# Patient Record
Sex: FEMALE | Race: WHITE | NOT HISPANIC OR LATINO | Employment: FULL TIME | URBAN - METROPOLITAN AREA
[De-identification: names, ages, dates, MRNs, and addresses within clinical notes are randomized per-mention and may not be internally consistent; named-entity substitution may affect disease eponyms.]

---

## 2017-02-25 ENCOUNTER — COMMUNICATION - HEALTHEAST (OUTPATIENT)
Dept: FAMILY MEDICINE | Facility: CLINIC | Age: 47
End: 2017-02-25

## 2017-02-25 DIAGNOSIS — F41.9 ANXIETY: ICD-10-CM

## 2017-03-14 ENCOUNTER — OFFICE VISIT - HEALTHEAST (OUTPATIENT)
Dept: FAMILY MEDICINE | Facility: CLINIC | Age: 47
End: 2017-03-14

## 2017-03-14 DIAGNOSIS — G47.9 DIFFICULTY SLEEPING: ICD-10-CM

## 2017-03-14 DIAGNOSIS — F41.0 PANIC ATTACK: ICD-10-CM

## 2017-03-14 DIAGNOSIS — F41.9 ANXIETY: ICD-10-CM

## 2017-04-26 ENCOUNTER — COMMUNICATION - HEALTHEAST (OUTPATIENT)
Dept: FAMILY MEDICINE | Facility: CLINIC | Age: 47
End: 2017-04-26

## 2017-04-26 DIAGNOSIS — G47.00 INSOMNIA: ICD-10-CM

## 2017-05-03 ENCOUNTER — COMMUNICATION - HEALTHEAST (OUTPATIENT)
Dept: FAMILY MEDICINE | Facility: CLINIC | Age: 47
End: 2017-05-03

## 2017-05-09 ENCOUNTER — RECORDS - HEALTHEAST (OUTPATIENT)
Dept: ADMINISTRATIVE | Facility: OTHER | Age: 47
End: 2017-05-09

## 2017-05-25 ENCOUNTER — RECORDS - HEALTHEAST (OUTPATIENT)
Dept: ADMINISTRATIVE | Facility: OTHER | Age: 47
End: 2017-05-25

## 2017-09-18 ENCOUNTER — RECORDS - HEALTHEAST (OUTPATIENT)
Dept: ADMINISTRATIVE | Facility: OTHER | Age: 47
End: 2017-09-18

## 2017-10-27 ENCOUNTER — RECORDS - HEALTHEAST (OUTPATIENT)
Dept: ADMINISTRATIVE | Facility: OTHER | Age: 47
End: 2017-10-27

## 2018-04-10 ENCOUNTER — OFFICE VISIT - HEALTHEAST (OUTPATIENT)
Dept: FAMILY MEDICINE | Facility: CLINIC | Age: 48
End: 2018-04-10

## 2018-04-10 DIAGNOSIS — N95.1 MENOPAUSAL SYMPTOMS: ICD-10-CM

## 2018-04-10 DIAGNOSIS — Z01.419 WELL WOMAN EXAM: ICD-10-CM

## 2018-04-10 DIAGNOSIS — G47.9 SLEEP DIFFICULTIES: ICD-10-CM

## 2018-04-10 DIAGNOSIS — F41.1 GAD (GENERALIZED ANXIETY DISORDER): ICD-10-CM

## 2018-04-10 DIAGNOSIS — F41.9 ANXIETY: ICD-10-CM

## 2018-04-10 DIAGNOSIS — G56.00 CTS (CARPAL TUNNEL SYNDROME): ICD-10-CM

## 2018-04-10 DIAGNOSIS — E66.9 OBESITY: ICD-10-CM

## 2018-04-10 DIAGNOSIS — R63.5 WEIGHT GAIN: ICD-10-CM

## 2018-04-10 DIAGNOSIS — G47.33 OSA ON CPAP: ICD-10-CM

## 2018-04-10 LAB
ALBUMIN SERPL-MCNC: 4.3 G/DL (ref 3.5–5)
ALP SERPL-CCNC: 78 U/L (ref 45–120)
ALT SERPL W P-5'-P-CCNC: 23 U/L (ref 0–45)
ANION GAP SERPL CALCULATED.3IONS-SCNC: 8 MMOL/L (ref 5–18)
AST SERPL W P-5'-P-CCNC: 23 U/L (ref 0–40)
BILIRUB SERPL-MCNC: 0.6 MG/DL (ref 0–1)
BUN SERPL-MCNC: 16 MG/DL (ref 8–22)
CALCIUM SERPL-MCNC: 9.7 MG/DL (ref 8.5–10.5)
CHLORIDE BLD-SCNC: 105 MMOL/L (ref 98–107)
CHOLEST SERPL-MCNC: 226 MG/DL
CO2 SERPL-SCNC: 27 MMOL/L (ref 22–31)
CREAT SERPL-MCNC: 0.76 MG/DL (ref 0.6–1.1)
ERYTHROCYTE [DISTWIDTH] IN BLOOD BY AUTOMATED COUNT: 11.8 % (ref 11–14.5)
FASTING STATUS PATIENT QL REPORTED: YES
GFR SERPL CREATININE-BSD FRML MDRD: >60 ML/MIN/1.73M2
GLUCOSE BLD-MCNC: 106 MG/DL (ref 70–125)
HBA1C MFR BLD: 5.7 % (ref 3.5–6)
HCT VFR BLD AUTO: 40.4 % (ref 35–47)
HDLC SERPL-MCNC: 43 MG/DL
HGB BLD-MCNC: 13.8 G/DL (ref 12–16)
LDLC SERPL CALC-MCNC: 146 MG/DL
MCH RBC QN AUTO: 33.2 PG (ref 27–34)
MCHC RBC AUTO-ENTMCNC: 34.2 G/DL (ref 32–36)
MCV RBC AUTO: 97 FL (ref 80–100)
PLATELET # BLD AUTO: 253 THOU/UL (ref 140–440)
PMV BLD AUTO: 7.4 FL (ref 7–10)
POTASSIUM BLD-SCNC: 4.7 MMOL/L (ref 3.5–5)
PROT SERPL-MCNC: 7.7 G/DL (ref 6–8)
RBC # BLD AUTO: 4.16 MILL/UL (ref 3.8–5.4)
SODIUM SERPL-SCNC: 140 MMOL/L (ref 136–145)
TRIGL SERPL-MCNC: 184 MG/DL
TSH SERPL DL<=0.005 MIU/L-ACNC: 1.92 UIU/ML (ref 0.3–5)
WBC: 5.6 THOU/UL (ref 4–11)

## 2018-04-10 ASSESSMENT — MIFFLIN-ST. JEOR: SCORE: 1666.89

## 2018-04-16 ENCOUNTER — COMMUNICATION - HEALTHEAST (OUTPATIENT)
Dept: FAMILY MEDICINE | Facility: CLINIC | Age: 48
End: 2018-04-16

## 2018-04-22 ENCOUNTER — COMMUNICATION - HEALTHEAST (OUTPATIENT)
Dept: FAMILY MEDICINE | Facility: CLINIC | Age: 48
End: 2018-04-22

## 2018-04-22 DIAGNOSIS — F41.9 ANXIETY: ICD-10-CM

## 2018-04-26 ENCOUNTER — RECORDS - HEALTHEAST (OUTPATIENT)
Dept: ADMINISTRATIVE | Facility: OTHER | Age: 48
End: 2018-04-26

## 2018-05-10 ENCOUNTER — COMMUNICATION - HEALTHEAST (OUTPATIENT)
Dept: FAMILY MEDICINE | Facility: CLINIC | Age: 48
End: 2018-05-10

## 2018-05-10 DIAGNOSIS — F41.9 ANXIETY: ICD-10-CM

## 2018-06-07 ENCOUNTER — COMMUNICATION - HEALTHEAST (OUTPATIENT)
Dept: FAMILY MEDICINE | Facility: CLINIC | Age: 48
End: 2018-06-07

## 2018-06-07 DIAGNOSIS — N95.1 MENOPAUSAL SYMPTOMS: ICD-10-CM

## 2018-08-13 ENCOUNTER — COMMUNICATION - HEALTHEAST (OUTPATIENT)
Dept: FAMILY MEDICINE | Facility: CLINIC | Age: 48
End: 2018-08-13

## 2019-05-12 ENCOUNTER — COMMUNICATION - HEALTHEAST (OUTPATIENT)
Dept: FAMILY MEDICINE | Facility: CLINIC | Age: 49
End: 2019-05-12

## 2019-05-12 DIAGNOSIS — F41.9 ANXIETY: ICD-10-CM

## 2019-05-28 ENCOUNTER — COMMUNICATION - HEALTHEAST (OUTPATIENT)
Dept: TELEHEALTH | Facility: CLINIC | Age: 49
End: 2019-05-28

## 2019-06-04 ENCOUNTER — OFFICE VISIT - HEALTHEAST (OUTPATIENT)
Dept: FAMILY MEDICINE | Facility: CLINIC | Age: 49
End: 2019-06-04

## 2019-06-04 DIAGNOSIS — R73.03 PREDIABETES: ICD-10-CM

## 2019-06-04 DIAGNOSIS — R63.1 EXCESSIVE THIRST: ICD-10-CM

## 2019-06-04 DIAGNOSIS — Z12.31 VISIT FOR SCREENING MAMMOGRAM: ICD-10-CM

## 2019-06-04 LAB
ANION GAP SERPL CALCULATED.3IONS-SCNC: 9 MMOL/L (ref 5–18)
BUN SERPL-MCNC: 15 MG/DL (ref 8–22)
CALCIUM SERPL-MCNC: 9.8 MG/DL (ref 8.5–10.5)
CHLORIDE BLD-SCNC: 106 MMOL/L (ref 98–107)
CO2 SERPL-SCNC: 24 MMOL/L (ref 22–31)
CREAT SERPL-MCNC: 0.8 MG/DL (ref 0.6–1.1)
GFR SERPL CREATININE-BSD FRML MDRD: >60 ML/MIN/1.73M2
GLUCOSE BLD-MCNC: 107 MG/DL (ref 70–125)
HBA1C MFR BLD: 5.9 % (ref 3.5–6)
POTASSIUM BLD-SCNC: 4.5 MMOL/L (ref 3.5–5)
SODIUM SERPL-SCNC: 139 MMOL/L (ref 136–145)

## 2019-07-26 ENCOUNTER — OFFICE VISIT - HEALTHEAST (OUTPATIENT)
Dept: FAMILY MEDICINE | Facility: CLINIC | Age: 49
End: 2019-07-26

## 2019-07-26 DIAGNOSIS — G47.33 OSA ON CPAP: ICD-10-CM

## 2019-07-26 DIAGNOSIS — E66.01 MORBID OBESITY (H): ICD-10-CM

## 2019-07-26 DIAGNOSIS — Z00.01 ENCOUNTER FOR GENERAL ADULT MEDICAL EXAMINATION WITH ABNORMAL FINDINGS: ICD-10-CM

## 2019-07-26 DIAGNOSIS — F41.9 ANXIETY: ICD-10-CM

## 2019-07-26 DIAGNOSIS — R73.03 PREDIABETES: ICD-10-CM

## 2019-07-26 DIAGNOSIS — N95.1 PERIMENOPAUSAL: ICD-10-CM

## 2019-07-26 DIAGNOSIS — Z11.3 SCREEN FOR STD (SEXUALLY TRANSMITTED DISEASE): ICD-10-CM

## 2019-07-26 DIAGNOSIS — Z12.4 SCREENING FOR CERVICAL CANCER: ICD-10-CM

## 2019-07-26 RX ORDER — ALPRAZOLAM 1 MG
TABLET ORAL
Qty: 15 TABLET | Refills: 0 | Status: SHIPPED | OUTPATIENT
Start: 2019-07-26

## 2019-07-26 ASSESSMENT — MIFFLIN-ST. JEOR: SCORE: 1670.18

## 2019-07-29 LAB
C TRACH DNA SPEC QL PROBE+SIG AMP: NEGATIVE
HPV SOURCE: NORMAL
HUMAN PAPILLOMA VIRUS 16 DNA: NEGATIVE
HUMAN PAPILLOMA VIRUS 18 DNA: NEGATIVE
HUMAN PAPILLOMA VIRUS FINAL DIAGNOSIS: NORMAL
HUMAN PAPILLOMA VIRUS OTHER HR: NEGATIVE
N GONORRHOEA DNA SPEC QL NAA+PROBE: NEGATIVE
SPECIMEN DESCRIPTION: NORMAL

## 2019-07-30 ENCOUNTER — COMMUNICATION - HEALTHEAST (OUTPATIENT)
Dept: FAMILY MEDICINE | Facility: CLINIC | Age: 49
End: 2019-07-30

## 2019-07-31 LAB
BKR LAB AP ABNORMAL BLEEDING: NO
BKR LAB AP BIRTH CONTROL/HORMONES: NORMAL
BKR LAB AP CERVICAL APPEARANCE: NORMAL
BKR LAB AP GYN ADEQUACY: NORMAL
BKR LAB AP GYN INTERPRETATION: NORMAL
BKR LAB AP HPV REFLEX: NORMAL
BKR LAB AP LMP: NORMAL
BKR LAB AP PATIENT STATUS: NORMAL
BKR LAB AP PREVIOUS ABNORMAL: NORMAL
BKR LAB AP PREVIOUS NORMAL: NORMAL
HIGH RISK?: YES
PATH REPORT.COMMENTS IMP SPEC: NORMAL
RESULT FLAG (HE HISTORICAL CONVERSION): NORMAL

## 2019-08-19 ENCOUNTER — RECORDS - HEALTHEAST (OUTPATIENT)
Dept: ADMINISTRATIVE | Facility: OTHER | Age: 49
End: 2019-08-19

## 2019-09-02 ENCOUNTER — COMMUNICATION - HEALTHEAST (OUTPATIENT)
Dept: FAMILY MEDICINE | Facility: CLINIC | Age: 49
End: 2019-09-02

## 2019-09-02 DIAGNOSIS — F41.9 ANXIETY: ICD-10-CM

## 2020-02-06 ENCOUNTER — RECORDS - HEALTHEAST (OUTPATIENT)
Dept: ADMINISTRATIVE | Facility: OTHER | Age: 50
End: 2020-02-06

## 2020-02-08 ENCOUNTER — TRANSFERRED RECORDS (OUTPATIENT)
Dept: HEALTH INFORMATION MANAGEMENT | Facility: CLINIC | Age: 50
End: 2020-02-08

## 2020-02-17 ENCOUNTER — TRANSFERRED RECORDS (OUTPATIENT)
Dept: HEALTH INFORMATION MANAGEMENT | Facility: CLINIC | Age: 50
End: 2020-02-17

## 2020-03-02 ENCOUNTER — RECORDS - HEALTHEAST (OUTPATIENT)
Dept: ADMINISTRATIVE | Facility: OTHER | Age: 50
End: 2020-03-02

## 2020-06-25 ENCOUNTER — COMMUNICATION - HEALTHEAST (OUTPATIENT)
Dept: FAMILY MEDICINE | Facility: CLINIC | Age: 50
End: 2020-06-25

## 2020-06-25 DIAGNOSIS — F41.9 ANXIETY: ICD-10-CM

## 2020-08-25 ENCOUNTER — RECORDS - HEALTHEAST (OUTPATIENT)
Dept: ADMINISTRATIVE | Facility: OTHER | Age: 50
End: 2020-08-25

## 2020-09-26 ENCOUNTER — COMMUNICATION - HEALTHEAST (OUTPATIENT)
Dept: FAMILY MEDICINE | Facility: CLINIC | Age: 50
End: 2020-09-26

## 2020-09-26 DIAGNOSIS — F41.9 ANXIETY: ICD-10-CM

## 2020-11-23 ENCOUNTER — OFFICE VISIT - HEALTHEAST (OUTPATIENT)
Dept: FAMILY MEDICINE | Facility: CLINIC | Age: 50
End: 2020-11-23

## 2020-11-23 DIAGNOSIS — G47.9 SLEEP DIFFICULTIES: ICD-10-CM

## 2020-11-23 DIAGNOSIS — R73.03 PREDIABETES: ICD-10-CM

## 2020-11-23 DIAGNOSIS — N95.1 MENOPAUSAL SYNDROME (HOT FLASHES): ICD-10-CM

## 2020-11-23 DIAGNOSIS — E55.9 VITAMIN D DEFICIENCY: ICD-10-CM

## 2020-11-23 DIAGNOSIS — G47.33 OSA ON CPAP: ICD-10-CM

## 2020-11-23 DIAGNOSIS — F41.1 GAD (GENERALIZED ANXIETY DISORDER): ICD-10-CM

## 2020-11-23 DIAGNOSIS — Z78.0 POSTMENOPAUSAL STATUS: ICD-10-CM

## 2020-11-23 DIAGNOSIS — E66.01 MORBID OBESITY (H): ICD-10-CM

## 2020-11-23 DIAGNOSIS — R06.02 SHORTNESS OF BREATH: ICD-10-CM

## 2020-11-23 DIAGNOSIS — Z13.220 LIPID SCREENING: ICD-10-CM

## 2020-11-23 DIAGNOSIS — Z00.01 ENCOUNTER FOR GENERAL ADULT MEDICAL EXAMINATION WITH ABNORMAL FINDINGS: ICD-10-CM

## 2020-11-23 LAB
ALBUMIN SERPL-MCNC: 4 G/DL (ref 3.5–5)
ALP SERPL-CCNC: 67 U/L (ref 45–120)
ALT SERPL W P-5'-P-CCNC: 44 U/L (ref 0–45)
ANION GAP SERPL CALCULATED.3IONS-SCNC: 9 MMOL/L (ref 5–18)
AST SERPL W P-5'-P-CCNC: 28 U/L (ref 0–40)
BILIRUB SERPL-MCNC: 0.3 MG/DL (ref 0–1)
BUN SERPL-MCNC: 17 MG/DL (ref 8–22)
CALCIUM SERPL-MCNC: 9.5 MG/DL (ref 8.5–10.5)
CHLORIDE BLD-SCNC: 106 MMOL/L (ref 98–107)
CHOLEST SERPL-MCNC: 168 MG/DL
CO2 SERPL-SCNC: 27 MMOL/L (ref 22–31)
CREAT SERPL-MCNC: 0.8 MG/DL (ref 0.6–1.1)
ERYTHROCYTE [DISTWIDTH] IN BLOOD BY AUTOMATED COUNT: 11.4 % (ref 11–14.5)
FASTING STATUS PATIENT QL REPORTED: YES
FSH SERPL-ACNC: 44.7 MIU/ML
GFR SERPL CREATININE-BSD FRML MDRD: >60 ML/MIN/1.73M2
GLUCOSE BLD-MCNC: 101 MG/DL (ref 70–125)
HBA1C MFR BLD: 6.5 %
HCT VFR BLD AUTO: 40.6 % (ref 35–47)
HDLC SERPL-MCNC: 31 MG/DL
HGB BLD-MCNC: 13.2 G/DL (ref 12–16)
LDLC SERPL CALC-MCNC: 106 MG/DL
MCH RBC QN AUTO: 30.9 PG (ref 27–34)
MCHC RBC AUTO-ENTMCNC: 32.5 G/DL (ref 32–36)
MCV RBC AUTO: 95 FL (ref 80–100)
PLATELET # BLD AUTO: 401 THOU/UL (ref 140–440)
PMV BLD AUTO: 6.9 FL (ref 7–10)
POTASSIUM BLD-SCNC: 4.8 MMOL/L (ref 3.5–5)
PROT SERPL-MCNC: 7.5 G/DL (ref 6–8)
RBC # BLD AUTO: 4.27 MILL/UL (ref 3.8–5.4)
SODIUM SERPL-SCNC: 142 MMOL/L (ref 136–145)
TRIGL SERPL-MCNC: 154 MG/DL
WBC: 7.3 THOU/UL (ref 4–11)

## 2020-11-23 ASSESSMENT — ANXIETY QUESTIONNAIRES
2. NOT BEING ABLE TO STOP OR CONTROL WORRYING: MORE THAN HALF THE DAYS
6. BECOMING EASILY ANNOYED OR IRRITABLE: NOT AT ALL
GAD7 TOTAL SCORE: 10
IF YOU CHECKED OFF ANY PROBLEMS ON THIS QUESTIONNAIRE, HOW DIFFICULT HAVE THESE PROBLEMS MADE IT FOR YOU TO DO YOUR WORK, TAKE CARE OF THINGS AT HOME, OR GET ALONG WITH OTHER PEOPLE: SOMEWHAT DIFFICULT
3. WORRYING TOO MUCH ABOUT DIFFERENT THINGS: MORE THAN HALF THE DAYS
5. BEING SO RESTLESS THAT IT IS HARD TO SIT STILL: NOT AT ALL
1. FEELING NERVOUS, ANXIOUS, OR ON EDGE: MORE THAN HALF THE DAYS
7. FEELING AFRAID AS IF SOMETHING AWFUL MIGHT HAPPEN: MORE THAN HALF THE DAYS
4. TROUBLE RELAXING: MORE THAN HALF THE DAYS

## 2020-11-23 ASSESSMENT — MIFFLIN-ST. JEOR: SCORE: 1751.83

## 2020-11-23 ASSESSMENT — PATIENT HEALTH QUESTIONNAIRE - PHQ9: SUM OF ALL RESPONSES TO PHQ QUESTIONS 1-9: 9

## 2020-11-24 LAB
25(OH)D3 SERPL-MCNC: 16.9 NG/ML (ref 30–80)
25(OH)D3 SERPL-MCNC: 16.9 NG/ML (ref 30–80)

## 2020-12-20 ENCOUNTER — COMMUNICATION - HEALTHEAST (OUTPATIENT)
Dept: FAMILY MEDICINE | Facility: CLINIC | Age: 50
End: 2020-12-20

## 2020-12-20 DIAGNOSIS — F41.1 GAD (GENERALIZED ANXIETY DISORDER): ICD-10-CM

## 2020-12-31 ENCOUNTER — COMMUNICATION - HEALTHEAST (OUTPATIENT)
Dept: FAMILY MEDICINE | Facility: CLINIC | Age: 50
End: 2020-12-31

## 2020-12-31 DIAGNOSIS — F41.9 ANXIETY: ICD-10-CM

## 2021-01-04 RX ORDER — CITALOPRAM HYDROBROMIDE 40 MG/1
TABLET ORAL
Qty: 90 TABLET | Refills: 3 | Status: SHIPPED | OUTPATIENT
Start: 2021-01-04 | End: 2022-01-21

## 2021-02-13 ENCOUNTER — COMMUNICATION - HEALTHEAST (OUTPATIENT)
Dept: FAMILY MEDICINE | Facility: CLINIC | Age: 51
End: 2021-02-13

## 2021-02-13 DIAGNOSIS — E55.9 VITAMIN D DEFICIENCY: ICD-10-CM

## 2021-05-07 ENCOUNTER — OFFICE VISIT - HEALTHEAST (OUTPATIENT)
Dept: FAMILY MEDICINE | Facility: CLINIC | Age: 51
End: 2021-05-07

## 2021-05-07 DIAGNOSIS — R73.01 IMPAIRED FASTING GLUCOSE: ICD-10-CM

## 2021-05-07 DIAGNOSIS — R10.2 PELVIC PAIN IN FEMALE: ICD-10-CM

## 2021-05-07 DIAGNOSIS — E66.01 MORBID OBESITY (H): ICD-10-CM

## 2021-05-07 LAB
ALBUMIN UR-MCNC: NEGATIVE G/DL
APPEARANCE UR: CLEAR
BACTERIA #/AREA URNS HPF: ABNORMAL /[HPF]
BILIRUB UR QL STRIP: NEGATIVE
COLOR UR AUTO: YELLOW
GLUCOSE UR STRIP-MCNC: NEGATIVE MG/DL
HBA1C MFR BLD: 5.9 %
HGB UR QL STRIP: NEGATIVE
KETONES UR STRIP-MCNC: NEGATIVE MG/DL
LEUKOCYTE ESTERASE UR QL STRIP: ABNORMAL
NITRATE UR QL: NEGATIVE
PH UR STRIP: 5.5 [PH] (ref 5–8)
RBC #/AREA URNS AUTO: ABNORMAL HPF
SP GR UR STRIP: 1.02 (ref 1–1.03)
SQUAMOUS #/AREA URNS AUTO: ABNORMAL LPF
UROBILINOGEN UR STRIP-ACNC: ABNORMAL
WBC #/AREA URNS AUTO: ABNORMAL HPF

## 2021-05-07 ASSESSMENT — MIFFLIN-ST. JEOR: SCORE: 1771.67

## 2021-05-08 LAB — BACTERIA SPEC CULT: NORMAL

## 2021-05-16 ENCOUNTER — COMMUNICATION - HEALTHEAST (OUTPATIENT)
Dept: FAMILY MEDICINE | Facility: CLINIC | Age: 51
End: 2021-05-16

## 2021-05-16 DIAGNOSIS — R10.84 ABDOMINAL PAIN, GENERALIZED: ICD-10-CM

## 2021-05-16 DIAGNOSIS — R10.31 RLQ ABDOMINAL PAIN: ICD-10-CM

## 2021-05-21 ENCOUNTER — RECORDS - HEALTHEAST (OUTPATIENT)
Dept: ADMINISTRATIVE | Facility: OTHER | Age: 51
End: 2021-05-21

## 2021-05-26 ASSESSMENT — PATIENT HEALTH QUESTIONNAIRE - PHQ9: SUM OF ALL RESPONSES TO PHQ QUESTIONS 1-9: 9

## 2021-05-27 VITALS
OXYGEN SATURATION: 99 % | WEIGHT: 245.38 LBS | SYSTOLIC BLOOD PRESSURE: 131 MMHG | TEMPERATURE: 97 F | BODY MASS INDEX: 38.51 KG/M2 | DIASTOLIC BLOOD PRESSURE: 73 MMHG | HEART RATE: 79 BPM | HEIGHT: 67 IN

## 2021-05-28 ENCOUNTER — RECORDS - HEALTHEAST (OUTPATIENT)
Dept: ADMINISTRATIVE | Facility: OTHER | Age: 51
End: 2021-05-28

## 2021-05-28 ASSESSMENT — ANXIETY QUESTIONNAIRES: GAD7 TOTAL SCORE: 10

## 2021-05-28 NOTE — TELEPHONE ENCOUNTER
RN cannot approve Refill Request    RN can NOT refill this medication PCP messaged that patient is overdue for Office Visit. Last office visit: 3/14/2017 Reva Busby DO Last Physical: 4/10/2018 Last MTM visit: Visit date not found Last visit same specialty: 3/14/2017 Reva Busby DO.  Next visit within 3 mo: Visit date not found  Next physical within 3 mo: Visit date not found      Angela Gentile, [facility] Triage/Med Refill 5/12/2019    Requested Prescriptions   Pending Prescriptions Disp Refills     citalopram (CELEXA) 40 MG tablet 90 tablet 3     Sig: Take 1 tablet (40 mg total) by mouth daily.       SSRI Refill Protocol  Failed - 5/12/2019  8:42 PM        Failed - PCP or prescribing provider visit in last year     Last office visit with prescriber/PCP: 3/14/2017 Reva Busby DO OR same dept: Visit date not found OR same specialty: 3/14/2017 Reva Busby DO  Last physical: 4/10/2018 Last MTM visit: Visit date not found   Next visit within 3 mo: Visit date not found  Next physical within 3 mo: Visit date not found  Prescriber OR PCP: Reva Busby DO  Last diagnosis associated with med order: 1. Anxiety  - citalopram (CELEXA) 40 MG tablet; Take 1 tablet (40 mg total) by mouth daily.  Dispense: 90 tablet; Refill: 3    If protocol passes may refill for 12 months if within 3 months of last provider visit (or a total of 15 months).

## 2021-05-29 NOTE — PROGRESS NOTES
ASSESSMENT:  1. Excessive thirst  Glycosylated Hemoglobin A1c   2. Visit for screening mammogram     3. Prediabetes  Basic Metabolic Panel       PLAN:  Lab work is consistent with prediabetes, discussed dietary modifications, increasing exercise to control this.  Patient has an upcoming physical with Dr. Busby in a couple months and discussed that if she make some positive changes us to be a good time to potentially recheck at least fasting blood sugar and see if there has been improvements.  No problem-specific Assessment & Plan notes found for this encounter.    The following high BMI interventions were performed this visit: encouragement to exercise, weight monitoring and dietary management education, guidance, and counseling  There are no Patient Instructions on file for this visit.    Orders Placed This Encounter   Procedures     Glycosylated Hemoglobin A1c     Basic Metabolic Panel     Medications Discontinued During This Encounter   Medication Reason     citalopram (CELEXA) 40 MG tablet Duplicate order       Return in about 6 weeks (around 7/16/2019) for Annual exam.    CHIEF COMPLAINT:  Chief Complaint   Patient presents with     Anxiety     pt states she does not know if it is anxiety but she get short of breath at times as is worried about her heart      Diabetes     pt is worried about having diabetes due to her mother having type 2        HISTORY OF PRESENT ILLNESS:  Natalie is a 48 y.o. female here today to discuss positive diabetes.  Patient also brought up anxiety with the nurse but did not discuss with me and so focused on the on diabetes prediabetes this visit.  Patient states she is little concerned about diabetes of her mother has type 2 diabetes and it does run in her family.  She knows she is overweight and is also been feeling like she has had excessive thirst lately.  She is wondering if some labs can be checked to assess diabetes and blood sugar to see where she is at.  She would like to  work on making some lifestyle changes as it applies to this issue and would like some recommendations on dietary change.    REVIEW OF SYSTEMS:        All other systems are negative  PFSH:  Reviewed, no changes      TOBACCO USE:  Social History     Tobacco Use   Smoking Status Never Smoker   Smokeless Tobacco Never Used       VITALS:  Vitals:    06/04/19 0953   BP: 116/70   Patient Site: Left Arm   Patient Position: Sitting   Cuff Size: Adult Large   Pulse: 84   Resp: 16   Weight: (!) 230 lb 4 oz (104.4 kg)     Wt Readings from Last 3 Encounters:   06/04/19 (!) 230 lb 4 oz (104.4 kg)   04/10/18 221 lb 6.4 oz (100.4 kg)   03/14/17 203 lb 11.2 oz (92.4 kg)       PHYSICAL EXAM:   /70 (Patient Site: Left Arm, Patient Position: Sitting, Cuff Size: Adult Large)   Pulse 84   Resp 16   Wt (!) 230 lb 4 oz (104.4 kg)   BMI 36.06 kg/m    General appearance: alert, appears stated age and cooperative  Lungs: clear to auscultation bilaterally  Heart: regular rate and rhythm, S1, S2 normal, no murmur, click, rub or gallop  Neurologic: Grossly normal  Psychologic: Mood and affect normal.      DATA REVIEWED:  Additional History from Old Records Summarized (2): 0  Decision to Obtain Records (1): 0  Radiology Tests Summarized or Ordered (1): 00  Labs Reviewed or Ordered (1): 1  Medicine Test Summarized or Ordered (1): 0  Independent Review of EKG or X-RAY(2 each): 0    The visit lasted a total of 25 minutes face to face with the patient. Over 50% of the time was spent counseling and educating the patient about plan of care.    MEDICATIONS:  Current Outpatient Medications   Medication Sig Dispense Refill     ALPRAZolam (XANAX) 1 MG tablet Take 1/2 tablet by mouth as needed for panic attack 10 tablet 0     citalopram (CELEXA) 40 MG tablet TAKE ONE TABLET BY MOUTH ONE TIME DAILY 90 tablet 3     conjugated estrogens (PREMARIN) vaginal cream Apply peasized amount intravaginally 3 days per week as directed 42.5 g 1     minocycline  (MINOCIN,DYNACIN) 100 MG capsule TAKE ONE CAPSULE BY MOUTH TWICE A DAY  3     progesterone (PROMETRIUM) 100 MG capsule TAKE 1 CAPSULE (100 MG TOTAL) BY MOUTH AT BEDTIME. 30 capsule 2     No current facility-administered medications for this visit.        This note has been dictated using voice recognition software. Any grammatical or context distortions are unintentional and inherent to the software

## 2021-05-30 VITALS — WEIGHT: 203.7 LBS | BODY MASS INDEX: 30.97 KG/M2

## 2021-05-30 NOTE — PROGRESS NOTES
FEMALE ADULT PREVENTIVE EXAM    CHIEF COMPLAINT:  Female preventive exam.    SUBJECTIVE:  Natalie Kitchen is a 48 y.o. female here for annual physical     said last week that he is  her. Wasn't expecting even though hasnt been good awhile. Did counseling. infidelity . Had trust issues.   -erbal and emotional no physical.  10=girl/13/15 -boys  -do a lot of family things together. Kids devastated.  17 years, mom and brother and sister carina have been supportive.   -will have to go back to work now. 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 Daughter only 10.   -gone back to family counseling fr the kids as a couple.   -citalopram has been helpful up till now. Would have scored the PHQ9 MUCH differently 1 mo ago.   -had episodes of shortness of breath over the last year and doesn't know if panic related. Not sure if were triggered.  No other sx with it. Typically would come on rest   - 2 kids asthma, and allergies but she doesn't have any.   -I think I am in menopause. Doesn't remember last time had period. Gets maybe once a year . Get hot flashes.   -has cpap and was doing progesterone for sleep. Sleep better.   -had a panic attack one day, nauseated, lied down and wasn't sure if diabetes because mom had   -has had difficult time being intimate with spouse, been a long time. Does not think needs std screening   -hx abn pap college cryocautery       She  has a past medical history of Abnormal Pap smear of cervix and Post partum depression.    Lab Results   Component Value Date    WBC 5.6 04/10/2018    HGB 13.8 04/10/2018    HCT 40.4 04/10/2018    MCV 97 04/10/2018     04/10/2018     06/04/2019    K 4.5 06/04/2019    BUN 15 06/04/2019     Lab Results   Component Value Date    CHOL 226 (H) 04/10/2018    HDL 43 (L) 04/10/2018    LDLCALC 146 (H) 04/10/2018    TRIG 184 (H) 04/10/2018     Lab Results    Component Value Date    TSH 1.92 04/10/2018     BP Readings from Last 3 Encounters:   07/26/19 109/62   06/04/19 116/70   04/10/18 102/72       Surgeries:    Past Surgical History:   Procedure Laterality Date     NO PAST SURGERIES         Family History:  Her family history includes Asthma in her unknown relative; Brain cancer in her unknown relative; Depression in her unknown relative; Diabetes in her unknown relative; Hypertension in her unknown relative; Stroke in her unknown relative.    Social History:  She  reports that she has never smoked. She has never used smokeless tobacco. She reports that she drinks about 2.5 oz of alcohol per week. She reports that she does not use drugs.    Medications:    Current Outpatient Medications:      ALPRAZolam (XANAX) 1 MG tablet, Take 1/2 tablet by mouth as needed for panic attack, Disp: 15 tablet, Rfl: 0     citalopram (CELEXA) 40 MG tablet, TAKE ONE TABLET BY MOUTH ONE TIME DAILY, Disp: 90 tablet, Rfl: 3  HELD MEDICATIONS: None.    Allergies:  No latex allergies.  Allergies   Allergen Reactions     Amoxicillin      Vitamin E Rash       RISK BEHAVIOR & HEALTH HABITS  History of abnormal pap smear: in college, had cryotherapy   Date of previous pap smear: 2017 at [facility]   Mammography: 2018 normal, dense  Regular Exercise: no.  Balanced diet: yes .  Seat Belt Use: yes   Guns: NO  Sun Screen: YES  Dental Care: YES    REVIEW OF SYSTEMS:  Complete head to toe review of systems is otherwise negative except as above.    OBJECTIVE:  VITAL SIGNS:    Vitals:    07/26/19 1202   BP: 109/62   Pulse: 77   Resp: 8   Temp: 98.1  F (36.7  C)   SpO2: 98%     GENERAL:  Patient alert, in no acute distress.  EYES: PERRLA. Extraoccular movements intact, pupils equal, reactive to light and accommodation.  Normal conjunctiva and lids.  Undilated fudoscopic exam normal, including normal size, appearance cup-to-disc ratio.  Normal posterior segments, including no exudates or  hemorrhages.  ENT:  Hearing grossly normal.  Normal appearance to ears and nose.  Bilateral TM s, external canals, oropharynx normal. Normal lips, gums and teeth.  Normal nasal mucosa, septum and turbinates.  NECK:  Supple, without thyromegaly or mass.  RESP:  Clear to auscultation without crackles, wheezes or distress.  Normal respiratory effort.   CV:  Regular rate and rhythm without murmurs, rubs or gallops.  Normal carotid, abdominal aorta, femoral and pedal pulses.  No varicosities or edema.  ABDOMEN:  Soft, non-tender, without hepatosplenomegaly, masses, or hernias.   BREASTS:  Nontender, without masses, nipple discharge, erythema, or axillary adenopathy.    :  Normal pelvic exam, including external genitalia with normal appearance and no lesions.  Normal vaginal exam, including no discharge and normal pelvic support, and no lesions.   Normal ureters, with no masses, tenerness or scarring.  Normal bladder, with no fullness, masses or tenderness.  Cervix well visualized, with normal appearance and no lesions or discharge.  Normal uterus, including normal size, shape, mobility with no tenderness.  Normal adnexa, including no nodules, masses or tenderness.  LYMPHATIC: Normal palpation of neck, groin and axilla..  No lymphadenopathy.  No bruising.  NEURO:  CN II-XII intact, motor & sensory function all intact.  DTR and reflexes normal.  PSYCHIATRIC:  Alert & oriented with normal mood and affect.  Good judgment and insight.  SKIN:  Normal inspection and palpation.  MUSCULOSKELETAL: Normal gait and station.  - Spine / Ribs / Pelvis: Normal inspection, ROM, stability and strength: Spine, Head, Neck, Upper and Lower Extremities.    ASSESSMENT & PLAN  Natalie was seen today for annual exam.    Diagnoses and all orders for this visit:    Encounter for general adult medical examination with abnormal [id number]-date with health maintenance although is due for annual mammogram and she will plan on scheduling  this.Pap smear updated today.  Fasting lab work also reviewed from office visit with Leeann Conrad so was not repeated today.  Did not feel inclined to need a lipid panel.  We discussed her prediabetes.  STD screening performed today    Screening for cervical cancer  -     Gynecologic Cytology (PAP Smear)    Screen for STD (sexually transmitted disease)  -     Chlamydia trachomatis & Neisseria gonorrhoeae, Amplified Detection    Anxiety -her anxiety was stable on the citalopram 40 mg up until a week ago when she found out about the divorce.  No recommendation for adjusting that dose yet at this time but we discussed at length the importance of having a counselor or therapist.  She will consider.  I did give her a short course of alprazolam to help with acute panic needs.  She has never abused this medication in the past and still had several leftover from 2017 but did not know if they are still okay to take since   -     ALPRAZolam (XANAX) 1 MG tablet; Take 1/2 tablet by mouth as needed for panic attack    Prediabetes/Morbid obesity (H)-discussed the importance of exercise for mental health and also to prevent the onset of diabetes.  Offered any specialty referrals if she is interested    Perimenopausal-discussed ways to minimize hot flashes    WALDEMAR on CPAP-wearing faithfully with good response  Patient instructions:  -for the hot flashes consider - vitamin E 400IU daily might help or   Primrose oil for hot flashes  - or black cohosh   -for the canker sores- could trial B complex vitamin for prevention and even consider topical hydrocortisone   -schedule mammogram  General  Immunizations reviewed and updated .  Alcohol use, safety and moderation discussed.  Recommended adequate calcium intake/osteoporosis prevention.  Discussed colon cancer screening at age 50, 45 if -American.  Diet and exercise reviewed, including goal of aerobic exercise 30-90 minutes most days of the week, moderation of portions sizes,  avoiding eating out and fast food and increase in fruits and vegetables.  Discussed safe sex practices.  Discussed & recommended seat belt (& motorcycle helmet) use.  Discussed & recommended smoke detector.  Discussed sun protection.  Discussed weight management.

## 2021-05-30 NOTE — PATIENT INSTRUCTIONS - HE
-for the hot flashes consider - vitamin E 400IU daily might help or   Primrose oil for hot flashes  - or black cohosh   -for the canker sores- could trial B complex vitamin for prevention and even consider topical hydrocortisone   -schedule mammogram   -

## 2021-05-31 NOTE — TELEPHONE ENCOUNTER
Refill Approved    Rx renewed per Medication Renewal Policy. Medication was last renewed on 4/10/18, last OV 7/26/19.    Cammie Shepard, [facility] Triage/Med Refill 9/2/2019     Requested Prescriptions   Pending Prescriptions Disp Refills     citalopram (CELEXA) 40 MG tablet 90 tablet 3     Sig: TAKE ONE TABLET BY MOUTH ONE TIME DAILY       SSRI Refill Protocol  Passed - 9/2/2019 11:45 AM        Passed - PCP or prescribing provider visit in last year     Last office visit with prescriber/PCP: 3/14/2017 Reva Busby DO OR same dept: 6/4/2019 Leeann Conrad FNP OR same specialty: 6/4/2019 Leeann Conrad FNP  Last physical: 7/26/2019 Last MTM visit: Visit date not found   Next visit within 3 mo: Visit date not found  Next physical within 3 mo: Visit date not found  Prescriber OR PCP: Reva Busby DO  Last diagnosis associated with med order: 1. Anxiety  - citalopram (CELEXA) 40 MG tablet; TAKE ONE TABLET BY MOUTH ONE TIME DAILY  Dispense: 90 tablet; Refill: 3    If protocol passes may refill for 12 months if within 3 months of last provider visit (or a total of 15 months).

## 2021-06-01 VITALS — HEIGHT: 67 IN | BODY MASS INDEX: 34.75 KG/M2 | WEIGHT: 221.4 LBS

## 2021-06-03 VITALS — BODY MASS INDEX: 35 KG/M2 | WEIGHT: 223 LBS | HEIGHT: 67 IN

## 2021-06-03 VITALS — WEIGHT: 230.25 LBS | BODY MASS INDEX: 36.06 KG/M2

## 2021-06-05 VITALS
RESPIRATION RATE: 16 BRPM | HEART RATE: 71 BPM | BODY MASS INDEX: 37.83 KG/M2 | TEMPERATURE: 97.8 F | SYSTOLIC BLOOD PRESSURE: 129 MMHG | WEIGHT: 241 LBS | HEIGHT: 67 IN | DIASTOLIC BLOOD PRESSURE: 69 MMHG

## 2021-06-09 NOTE — TELEPHONE ENCOUNTER
Refill Approved    Rx renewed per Medication Renewal Policy. Medication was last renewed on 09/02/2019.  Last office visit was 07/26/2019 with PCP.  Note sent to pharmacy to schedule appt.    Sudha White, [facility] Triage/Med Refill 6/25/2020     Requested Prescriptions   Pending Prescriptions Disp Refills     citalopram (CELEXA) 40 MG tablet 90 tablet 2     Sig: TAKE ONE TABLET BY MOUTH ONE TIME DAILY       SSRI Refill Protocol  Passed - 6/25/2020  4:37 PM        Passed - PCP or prescribing provider visit in last year     Last office visit with prescriber/PCP: 3/14/2017 Reva Busby DO OR same dept: Visit date not found OR same specialty: 6/4/2019 Leeann Conrad FNP  Last physical: 7/26/2019 Last MTM visit: Visit date not found   Next visit within 3 mo: Visit date not found  Next physical within 3 mo: Visit date not found  Prescriber OR PCP: Reva Busby DO  Last diagnosis associated with med order: 1. Anxiety  - citalopram (CELEXA) 40 MG tablet; TAKE ONE TABLET BY MOUTH ONE TIME DAILY  Dispense: 90 tablet; Refill: 2    If protocol passes may refill for 12 months if within 3 months of last provider visit (or a total of 15 months).

## 2021-06-09 NOTE — PROGRESS NOTES
ASSESSMENT and PLAN:  1. Anxiety  -Patient very stable on her current medication regimen.  Recommend continuing citalopram 1 tablet by mouth once daily.  Unfortunately she went through withdrawals after running out of the medication and encouraged her again to let us know if she is nearing aneed for refills and we would like to see her at least annually if possible.  We will try to bridge her in the future if ever she had run out before being able to secure an appointment.  - citalopram (CELEXA) 40 MG tablet; TAKE ONE TABLET BY MOUTH ONE TIME DAILY  Dispense: 90 tablet; Refill: 3    2. history of Post partum depression  -Stable on citalopram 40 mg    3. Panic attack  -Rare and intermittent but does well when she has a crutch such as Xanax in the event that she is having a panic attack which she did have when she ran out of her medication went through withdrawal.  He did have her fill out a controlled substance agreement today.  I did give her 10 tablets which likely last the year but if she needs a refill on the meantime I am okay with that as well    4. Difficulty sleeping  -We discussed given her symptoms the possibility for obstructive sleep apnea which if she is interested in a sleep study she should let me know and I will place a referral.  She is going to ask her  about her sleeping habits and snoring.  In the meantime we also discussed considering doing melatonin which works for her symptoms rather than Tylenol PM on a nightly basis if needed.      There are no Patient Instructions on file for this visit.    No orders of the defined types were placed in this encounter.    Medications Discontinued During This Encounter   Medication Reason     ALPRAZolam (XANAX) 1 MG tablet Reorder     citalopram (CELEXA) 40 MG tablet Reorder       No Follow-up on file.        The visit lasted a total of 25 minutes face to face with the patient. Over 50% of the time was spent counseling and educating the patient      CHIEF COMPLAINT:  Chief Complaint   Patient presents with     Medication Management     PHQ-9 & ALESHA-7 done today, need refills on alprazolam and citalopram       HISTORY OF PRESENT ILLNESS:  Natalie Kitchen is a 46 y.o. female who presents today for follow-up of generalized anxiety and history of postpartum depression.  She is due for a medication check since I had not seen her in approximately 13 months.  She notes that she had run out of her citalopram 40 mg not knowing that she could not get it refilled until she was seen by myself.  I unfortunately was out on medical leave and it was denied by 1 of my partners covering for me here in clinic.  Unfortunately patient has gone through significant withdrawal symptoms for at least 5 days.  She finally called the pharmacy back and they did give her some to hold her over until this office appointment.  She notes up until that her symptoms of anxiety and depression were very well managed.  Her ALESHA 7 score today is 1 and her PHQ 9 score today is 4.  She also had run out of her alprazolam which she only uses as a crutch in the event that she is having a panic attack indefinitely her withdrawal symptoms manifested as panic attacks and she is requesting a refill of this medication.  I last refilled it for her for 10 tablets greater than a year ago and she is out of this medication.  Patient resumed taking 40 mg of citalopram without gradually increasing.  She has tolerated it well and did not realize that she should have slowly titrated up.  She is no longer having any symptoms and her anxiety is very well controlled.  She has no other acute concerns today.  She does bring up however in the visit that sleep has been somewhat difficult for her lately where she wakes up at approximately 3 AM for the last 4-6 months out of nowhere.  She is not sure if she has a history of sleep apnea and has never paid attention to whether she snores.  Typically she will be up from  anywhere from 15 minutes to an hour trying to get back to sleep.  She has never taken anything it is a sleep aid such as Tylenol with Benadryl.  She notes that this is usually effective for helping her sleep when she does take it.  She does not wake up short of breath and has no heart palpitations or other symptoms associated.  No night sweats.  She also has concerns about a sore in her chest  That she notes does not seem to be healing the way she expected.  She saw dermatology 2 weeks ago and they did a full body scan and sprayed some lesions.  This 1 is still red and slightly crusty.  She wanted to ensure that this is no risk factor for skin cancer.  She is not due for complete physical as she goes to see a gynecologist in [place].  Still need to get records of last Pap smear.    REVIEW OF SYSTEMS:    Comprehensive review of systems is negative except as noted in the HPI.     PFSH:  Tobacco use and medications reviewed below.     TOBACCO USE:  History   Smoking Status     Never Smoker   Smokeless Tobacco     Not on file       VITALS:  Vitals:    03/14/17 1234   BP: 112/68   Patient Site: Right Arm   Patient Position: Sitting   Cuff Size: Adult Large   Pulse: 82   Resp: 16   Temp: 98.2  F (36.8  C)   TempSrc: Oral   Weight: 203 lb 11.2 oz (92.4 kg)     Wt Readings from Last 3 Encounters:   03/14/17 203 lb 11.2 oz (92.4 kg)   11/20/15 189 lb (85.7 kg)   10/22/15 188 lb (85.3 kg)       PHYSICAL EXAM:  Constitutional:  Reveals a 46 y.o. female in NAD.  Vitals:  Per nursing notes.  Neck:  Supple, thyroid not palpable.  Cardiac:  Regular rate and rhythm without murmurs, rubs, or gallops. Carotids without bruits. Legs without edema. Palpation of the radial pulse regular.  Lungs: Clear.  Respiratory effort normal.  Skin:   Without rash, bruise, or palpable lesions.  Minimally erythematous lesion on right chest wall mildly scaly.  Rheumatologic: Normal joints and nails of the hands.  Neurologic:  Cranial nerves II-XII  intact.     Psychiatric:  Mood appropriate, memory intact.          MEDICATIONS:  Current Outpatient Prescriptions   Medication Sig Dispense Refill     ALPRAZolam (XANAX) 1 MG tablet Take 1/2 tablet by mouth as needed for panic attack 10 tablet 0     citalopram (CELEXA) 40 MG tablet TAKE ONE TABLET BY MOUTH ONE TIME DAILY 90 tablet 3     minocycline (MINOCIN,DYNACIN) 100 MG capsule TAKE ONE CAPSULE BY MOUTH TWICE A DAY  3     No current facility-administered medications for this visit.

## 2021-06-11 NOTE — TELEPHONE ENCOUNTER
rx refilled. Remind pt should consider scheduling physical or med check in the next several months. Med checks can be done virtually as well

## 2021-06-11 NOTE — TELEPHONE ENCOUNTER
RN cannot approve Refill Request    RN can NOT refill this medication Protocol failed and NO refill given. Last office visit: 3/14/2017 Reva Busby DO Last Physical: 7/26/2019 Last MTM visit: Visit date not found Last visit same specialty: 6/4/2019 Leeann Conrad FNP.  Next visit within 3 mo: Visit date not found  Next physical within 3 mo: Visit date not found      [facility], [facility] Triage/Med Refill 9/29/2020    Requested Prescriptions   Pending Prescriptions Disp Refills     citalopram (CELEXA) 40 MG tablet [Pharmacy Med Name: CITALOPRAM HBR 40 MG TABLET] 90 tablet 0     Sig: TAKE 1 TABLET BY MOUTH EVERY DAY       [facility] Refill Protocol  Failed - 9/26/2020  9:31 AM        Failed - PCP or prescribing provider visit in last year     Last office visit with prescriber/PCP: 3/14/2017 Reva Busby DO OR same dept: Visit date not found OR same specialty: 6/4/2019 Leeann Conrad FNP  Last physical: 7/26/2019 Last MTM visit: Visit date not found   Next visit within 3 mo: Visit date not found  Next physical within 3 mo: Visit date not found  Prescriber OR PCP: Reva Busby DO  Last diagnosis associated with med order: 1. Anxiety  - citalopram (CELEXA) 40 MG tablet [Pharmacy Med Name: CITALOPRAM HBR 40 MG TABLET]; TAKE 1 TABLET BY MOUTH EVERY DAY  Dispense: 90 tablet; Refill: 0    If protocol passes may refill for 12 months if within 3 months of last provider visit (or a total of 15 months).

## 2021-06-13 NOTE — PATIENT INSTRUCTIONS - HE
-practice sleep hygiene that we discussed.   -consider magnesium 400mg every evening  -can consider taking melatonin to get established every evening 1 hour before bed .   -Im not opposed to starting sleep medication on you     For menopausal hot flashes  -vitamin E 400IU daily has been shown to be helpful. And sometimes adding primrose  Oil 500mg every evening  -estroven ?     - we can even consider switching your citalopram to venlafaxine (antidepressant).  For appetite we can consider adding the wellbutrin to help with cravings.   -or we can consider estrogen therapy (that might help with the sleep and the hot flashes).     -calcium 1200-1500mg of calcium per day  And 1000-200IU of vitamin d       -[facility]

## 2021-06-13 NOTE — TELEPHONE ENCOUNTER
Refill Approved    Rx renewed per Medication Renewal Policy. Medication was last renewed on 11/23/20.    [facility], [facility] Triage/Med Refill 12/22/2020     Requested Prescriptions   Pending Prescriptions Disp Refills     buPROPion (WELLBUTRIN XL) 150 MG 24 hr tablet [Pharmacy Med Name: BUPROPION HCL  MG TABLET] 30 tablet 2     Sig: TAKE 1 TABLET (150 MG TOTAL) BY MOUTH EVERY MORNING. FOR ANXIETY       Tricyclics/Misc Antidepressant/Antianxiety Meds Refill Protocol Passed - 12/20/2020 11:32 AM        Passed - PCP or prescribing provider visit in last year     Last office visit with prescriber/PCP: 3/14/2017 Reva Busby DO OR same dept: Visit date not found OR same specialty: 6/4/2019 Leeann Conrad FNP  Last physical: 11/23/2020 Last MTM visit: Visit date not found   Next visit within 3 mo: Visit date not found  Next physical within 3 mo: Visit date not found  Prescriber OR PCP: Reva Busby DO  Last diagnosis associated with med order: 1. ALESHA (generalized anxiety disorder)  - buPROPion (WELLBUTRIN XL) 150 MG 24 hr tablet [Pharmacy Med Name: BUPROPION HCL  MG TABLET]; Take 1 tablet (150 mg total) by mouth every morning. For anxiety  Dispense: 30 tablet; Refill: 2    If protocol passes may refill for 12 months if within 3 months of last provider visit (or a total of 15 months).

## 2021-06-13 NOTE — PROGRESS NOTES
FEMALE ADULT PREVENTIVE EXAM    CHIEF COMPLAINT:  Female preventive exam.    SUBJECTIVE:  Natalie Kitchen is a 50 y.o. female.  Patient is here for her annual physical and has several other concerns today.  Got sick covid 19- got from sons . One tested positive and one didn't. Oldest with 16 had asthma as a kid. 3.5 years didn't grow. No longer on medication and had fever for day and half and that was it.   Then younger 14 year old brother had persistent fever week and half.    11/6/20 started with sx. Tested on 8th . Result on 9th. Not surprised. Has a little lingering cough. No smell or taste.   Just got a job that starts next week. 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 Coping with the divorce and getting better.  Feels that mental health is somewhat stable but is having a little increased anxiety.  This past year  Fell in kitchen last dec and ripped hamstring 100% . Did the same thing in 2016.  Orthopedics was dumbfounded.  Lost job and covid happened a week after surgery. Was out a long time because couldn't interview . Didn't start looking for jobs again until June. -was extremely hard with age and work hx.   Has had a lot of worries about health this year. Biggest worry is shortness of breath. Feels like it is anxiety but then worries what if it is not. Tried hard to lose weight but hasnt been able to. Willing to take something for appetite suppression. Feels like never not hungry.   Knows needs the citalopram. Hadnt touched the xanax x 1 year and then  Suddenly has had some panic attacks. Few times has had to take 1/2 xanax to fall asleep. Cant fall asleep without   Hard to wake up if takes something. Hates the cpap. Feels like doesn't sleep at all with it on. Feels in a half sleep.  Used to wake up all the time, doesn't wake up in middle of night like used to. Now issue is trying to fall asleep which never had before.   Scared about type 2 dm2 .   Weight is up from last year . Been 1 year since  period.  Nov 20, 2019 - gets hot flashes all the time and will be sweating.  She is wondering if this is all more menopausal related.        She  has a past medical history of Abnormal Pap smear of cervix and Post partum depression.    Lab Results   Component Value Date    WBC 7.3 11/23/2020    HGB 13.2 11/23/2020    HCT 40.6 11/23/2020    MCV 95 11/23/2020     11/23/2020     11/23/2020    K 4.8 11/23/2020    BUN 17 11/23/2020     Lab Results   Component Value Date    CHOL 168 11/23/2020    HDL 31 (L) 11/23/2020    LDLCALC 106 11/23/2020    TRIG 154 (H) 11/23/2020     Lab Results   Component Value Date    TSH 1.92 04/10/2018     BP Readings from Last 3 Encounters:   11/23/20 129/69   07/26/19 109/62   06/04/19 116/70     Recent Results (from the past 240 hour(s))   Comprehensive Metabolic Panel    Collection Time: 11/23/20 12:35 PM   Result Value Ref Range    Sodium 142 136 - 145 mmol/L    Potassium 4.8 3.5 - 5.0 mmol/L    Chloride 106 98 - 107 mmol/L    CO2 27 22 - 31 mmol/L    Anion Gap, Calculation 9 5 - 18 mmol/L    Glucose 101 70 - 125 mg/dL    BUN 17 8 - 22 mg/dL    Creatinine 0.80 0.60 - 1.10 mg/dL    GFR MDRD Af Amer >60 >60 mL/min/1.73m2    GFR MDRD Non Af Amer >60 >60 mL/min/1.73m2    Bilirubin, Total 0.3 0.0 - 1.0 mg/dL    Calcium 9.5 8.5 - 10.5 mg/dL    Protein, Total 7.5 6.0 - 8.0 g/dL    Albumin 4.0 3.5 - 5.0 g/dL    Alkaline Phosphatase 67 45 - 120 U/L    AST 28 0 - 40 U/L    ALT 44 0 - 45 U/L   Lipid Graves FASTING    Collection Time: 11/23/20 12:35 PM   Result Value Ref Range    Cholesterol 168 <=199 mg/dL    Triglycerides 154 (H) <=149 mg/dL    HDL Cholesterol 31 (L) >=50 mg/dL    LDL Calculated 106 <=129 mg/dL    Patient Fasting > 8hrs? Yes    HM2(CBC w/o Differential)    Collection Time: 11/23/20 12:35 PM   Result Value Ref Range    WBC 7.3 4.0 - 11.0 thou/uL    RBC 4.27 3.80 - 5.40 mill/uL    Hemoglobin 13.2 12.0 - 16.0 g/dL    Hematocrit 40.6 35.0 - 47.0 %    MCV 95 80 - 100 fL     MCH 30.9 27.0 - 34.0 pg    MCHC 32.5 32.0 - 36.0 g/dL    RDW 11.4 11.0 - 14.5 %    Platelets 401 140 - 440 thou/uL    MPV 6.9 (L) 7.0 - 10.0 fL   Glycosylated Hemoglobin A1c    Collection Time: 11/23/20  1:29 PM   Result Value Ref Range    Hemoglobin A1c 6.5 (H) <=5.6 %   Follicle Stimulating Hormone (FSH)    Collection Time: 11/23/20  1:29 PM   Result Value Ref Range    FSH 44.7 mIU/mL   Vitamin D, Total (25-Hydroxy)    Collection Time: 11/23/20  1:29 PM   Result Value Ref Range    Vitamin D, Total (25-Hydroxy) 16.9 (L) 30.0 - 80.0 ng/mL     Surgeries:    Past Surgical History:   Procedure Laterality Date     NO PAST SURGERIES         Family History:  Her family history includes Asthma in her unknown relative; Brain cancer in her unknown relative; Depression in her unknown relative; Diabetes in her unknown relative; Hypertension in her unknown relative; Stroke in her unknown relative.    Social History:  She  reports that she has never smoked. She has never used smokeless tobacco. She reports current alcohol use of about 4.2 standard drinks of alcohol per week. She reports that she does not use drugs.    Medications:    Current Outpatient Medications:      ALPRAZolam (XANAX) 1 MG tablet, Take 1/2 tablet by mouth as needed for panic attack, Disp: 15 tablet, Rfl: 0     citalopram (CELEXA) 40 MG tablet, TAKE 1 TABLET BY MOUTH EVERY DAY, Disp: 90 tablet, Rfl: 0     buPROPion (WELLBUTRIN XL) 150 MG 24 hr tablet, Take 1 tablet (150 mg total) by mouth every morning. For anxiety, Disp: 30 tablet, Rfl: 2     ergocalciferol (ERGOCALCIFEROL) 1,250 mcg (50,000 unit) capsule, Take 1 capsule (50,000 Units total) by mouth once a week for 12 doses., Disp: 12 capsule, Rfl: 0  HELD MEDICATIONS: None.    Allergies:  No latex allergies.  Allergies   Allergen Reactions     Amoxicillin        RISK BEHAVIOR & HEALTH HABITS  History of abnormal pap smear: No history of abnormal Pap smear  Date of previous pap smear: 1 year  ago.  Mammography: Mammogram from August 2020 reviewed.  Recommend annual screening  Self Breast Exam: Yes.  Colon/Flex Sig: Not yet due.  DEXA: Not yet due.   Regular Exercise: Recommended increased exercise.  Balanced diet: Yes.  Seat Belt Use: Yes.  Calcium intake/Osteoporosis prevention: No recommended.    Guns: NO  Sun Screen: YES  Dental Care: YES    REVIEW OF SYSTEMS:  Complete head to toe review of systems is otherwise negative except as above.    OBJECTIVE:  VITAL SIGNS:    Vitals:    11/23/20 1402   BP: 129/69   Pulse: 71   Resp: (!) 72   Temp: 97.8  F (36.6  C)     GENERAL:  Patient alert, in no acute distress.  EYES: PERRLA. Extraoccular movements intact, pupils equal, reactive to light and accommodation.  Normal conjunctiva and lids.     ENT:  Hearing grossly normal.  Normal appearance to ears and nose.  Bilateral TM s, external canals, oropharynx normal. Normal lips, gums and teeth.  Normal nasal mucosa, septum and turbinates.  NECK:  Supple, without thyromegaly or mass.  RESP:  Clear to auscultation without crackles, wheezes or distress.  Normal respiratory effort.   CV:  Regular rate and rhythm without murmurs, rubs or gallops.  Normal carotid, abdominal aorta, femoral and pedal pulses.  No varicosities or edema.  ABDOMEN:  Soft, non-tender, without hepatosplenomegaly, masses, or hernias.   BREASTS:  Nontender, without masses, nipple discharge, erythema, or axillary adenopathy.    :    LYMPHATIC: Normal palpation of neck, groin and axilla..  No lymphadenopathy.  No bruising.  NEURO:  CN II-XII intact, motor & sensory function all intact.  DTR and reflexes normal.  PSYCHIATRIC:  Alert & oriented with normal mood and affect.  Good judgment and insight.  SKIN:  Normal inspection and palpation.  MUSCULOSKELETAL: Normal gait and station.  - Spine / Ribs / Pelvis: Normal inspection, ROM, stability and strength: Spine, Head, Neck, Upper and Lower Extremities.    ASSESSMENT & PLAN  Natalie was seen today  for annual exam.    Diagnoses and all orders for this visit:    Encounter for general adult medical examination with abnormal findings-patient was seen for annual physical.  The following fasting labs were evaluated today.  Does not have advanced care directive on file.  No immunizations due at this time.  Spent time discussing the importance of weight loss and increased exercise.  Consider my fitness pal or referrals to our obesity clinic or weight watchers  -     Comprehensive Metabolic Panel  -     Lipid Cascade FASTING  -     HM2(CBC w/o Differential)  -     Vitamin D, Total (25-Hydroxy)    Lipid screening  -     Lipid Cascade FASTING    Sleep difficulties-we spent time discussing sleep hygiene and strategies that she could implement.  Could consider adding in magnesium at bedtime to see if this is helpful.  Melatonin can be considered.  I would not recommend doing nightly Xanax but I did discuss that since she has hardly used it in the year if she needs a refill later in the year I am happy to do so.  Can be use for acute anxiety.    Prediabetes-unfortunately A1c came back at 6.5 and I called patient to discuss with her.  She does not want to initiate Metformin even though we discussed could help with weight loss I would like to try to make some changes in her lifestyle on her own and then follow-up in the next 2 to 3 months for repeat labs.  We discussed I would not add this to her diagnosis on her problem list yet until she has another confirmed elevated blood sugar.  -     Glycosylated Hemoglobin A1c  -     Vitamin D, Total (25-Hydroxy)    Menopausal syndrome (hot flashes)/Postmenopausal status-FSH does indicate that she is in menopause.  We discussed that the constellation of her symptoms including heightened anxiety and sleep issues could be all related to menopause.  We discussed that we could consider adding an estrogen.  She was not inclined to do so.  Could switch her citalopram to different  antidepressant which may be could help with some of those symptoms.  Consider vitamin E 400 units daily and primrose oil.  We could consider gabapentin.  For now she might start with supplementation.  -     Follicle Stimulating Hormone (FSH)      Shortness of breath-patient's shortness of breath is concerning her most of all and preceded her infection with COVID-19.  I did discuss with her that I think it is somewhat related to her weight pushing up on her diaphragm.  Seems to be at rest and she has to take a big breath and we discussed the importance of incentive spirometry but just by taking deep breaths at home and the more that she moves the better it would be.  She has no cough no shortness of breath with exertion.  I offered to do an x-ray and EKG it would be reassuring but I did not think that this sounded cardiac.  She is okay with monitoring for now and does admit that part of it could be an anxiety piece especially since it comes on at rest when she is just thinking about things.  Discussed if any changes that she should alert me right away we can further evaluate    ALSEHA (generalized anxiety disorder)-patient still having a significant amount of anxiety despite maximum dose of citalopram.  We did discuss that we could switch to a different SSRI.  Part of her anxiety may be the upcoming job and all that she has had to deal with with the divorce and Covid 19 etc.  Could be just situational.  Since she had talked about wanting something for weight loss we did discuss the potential that adding in Wellbutrin could be helpful for curbing cravings however we also recognized and I counseled her that it could heightened anxiety.  She would like to trial it in addition to the citalopram dosing that she is currently on and we would follow-up sometime in the next 1 to 2 months and how she is doing.  -     buPROPion (WELLBUTRIN XL) 150 MG 24 hr tablet; Take 1 tablet (150 mg total) by mouth every morning. For  anxiety    Vitamin D deficiency-patient significantly deficient in vitamin D.  We will start high-dose 50,000 units for 12 weeks then take 2000 IU daily thereafter  -     ergocalciferol (ERGOCALCIFEROL) 1,250 mcg (50,000 unit) capsule; Take 1 capsule (50,000 Units total) by mouth once a week for 12 doses.    Obesity with comorbidity.  Spent a long time discussing weight and options for that including medication therapy etc.  If she wanted to opt for medication therapy including some of her diabetic medications which have been proven to help with weight loss such as injectables which she was not keen on we could initiate her or have her seen at the obesity clinic.  She wants to start with the Wellbutrin and then follow-up      Follow-up in 2 to 3 months    Patient instructions-  -practice sleep hygiene that we discussed.   -consider magnesium 400mg every evening  -can consider taking melatonin to get established every evening 1 hour before bed .   -Im not opposed to starting sleep medication on you     For menopausal hot flashes  -vitamin E 400IU daily has been shown to be helpful. And sometimes adding primrose  Oil 500mg every evening  -estroven ?     - we can even consider switching your citalopram to venlafaxine (antidepressant).  For appetite we can consider adding the wellbutrin to help with cravings.   -or we can consider estrogen therapy (that might help with the sleep and the hot flashes).     -calcium 1200-1500mg of calcium per day  And 1000-200IU of vitamin d       [facility]       General  Immunizations reviewed and updated .  Alcohol use, safety and moderation discussed.  Recommended adequate calcium intake/osteoporosis prevention.  Discussed colon cancer screening at age 50, 45 if -American.  Diet and exercise reviewed, including goal of aerobic exercise 30-90 minutes most days of the week, moderation of portions sizes, avoiding eating out and fast food and increase in fruits and  vegetables.  Discussed safe sex practices.  Discussed & recommended seat belt (& motorcycle helmet) use.  Discussed & recommended smoke detector.  Discussed sun protection.  Discussed weight management.

## 2021-06-14 NOTE — TELEPHONE ENCOUNTER
Refill Approved    Rx renewed per Medication Renewal Policy. Medication was last renewed on 9/29/20.    [facility], [facility] Triage/Med Refill 1/4/2021     Requested Prescriptions   Pending Prescriptions Disp Refills     citalopram (CELEXA) 40 MG tablet 90 tablet 0     Sig: TAKE 1 TABLET BY MOUTH EVERY DAY       [facility] Refill Protocol  Passed - 12/31/2020  4:28 PM        Passed - PCP or prescribing provider visit in last year     Last office visit with prescriber/PCP: 3/14/2017 Reva Busby DO OR same dept: Visit date not found OR same specialty: 6/4/2019 Leeann Conrad FNP  Last physical: 11/23/2020 Last MTM visit: Visit date not found   Next visit within 3 mo: Visit date not found  Next physical within 3 mo: Visit date not found  Prescriber OR PCP: Reva Busby DO  Last diagnosis associated with med order: 1. Anxiety  - citalopram (CELEXA) 40 MG tablet; TAKE 1 TABLET BY MOUTH EVERY DAY  Dispense: 90 tablet; Refill: 0    If protocol passes may refill for 12 months if within 3 months of last provider visit (or a total of 15 months).

## 2021-06-15 NOTE — TELEPHONE ENCOUNTER
RN cannot approve Refill Request    RN can NOT refill this medication med is not covered by policy/route to provider. Last office visit: 3/14/2017 Reva Busby DO Last Physical: 11/23/2020 Last MTM visit: Visit date not found Last visit same specialty: 6/4/2019 Leeann Conrad FNP.  Next visit within 3 mo: Visit date not found  Next physical within 3 mo: Visit date not found      Tammie F Severson, [facility] Connection Triage/Med Refill 2/13/2021    Requested Prescriptions   Pending Prescriptions Disp Refills     ergocalciferol (ERGOCALCIFEROL) 1,250 mcg (50,000 unit) capsule [Pharmacy Med Name: VITAMIN D2 1.25MG(50,000 UNIT)] 12 capsule 0     Sig: TAKE 1 CAPSULE (50,000 UNITS TOTAL) BY MOUTH ONCE A WEEK FOR 12 DOSES.       There is no refill protocol information for this order

## 2021-06-16 PROBLEM — G47.9 SLEEP DIFFICULTIES: Status: ACTIVE | Noted: 2018-04-10

## 2021-06-16 PROBLEM — G47.33 OSA ON CPAP: Status: ACTIVE | Noted: 2018-04-10

## 2021-06-16 PROBLEM — N95.1 MENOPAUSAL SYMPTOMS: Status: ACTIVE | Noted: 2018-04-10

## 2021-06-16 PROBLEM — E66.01 MORBID OBESITY (H): Status: ACTIVE | Noted: 2019-07-28

## 2021-06-17 NOTE — PROGRESS NOTES
Your urine looks good overall, but will send to culture. If it is growing bacteria we will treat for infection. No blood.  I will be in touch with your ultrasound results.  Yasmine Masterson, DO

## 2021-06-17 NOTE — PROGRESS NOTES
ASSESSMENT & PLAN:   Natalie was seen today for annual exam.    Diagnoses and all orders for this visit:    Well woman exam  -     Lipid [facility] FASTING  -     Comprehensive Metabolic Panel  -     Glycosylated Hemoglobin A1c  -     HM2(CBC w/o Differential)  -     Thyroid [facility]    CTS (carpal tunnel syndrome)    WALDEMAR on CPAP    ALESHA (generalized anxiety disorder)    Sleep difficulties    Weight gain    Menopausal symptoms    Anxiety  -     citalopram (CELEXA) 40 MG tablet; TAKE ONE TABLET BY MOUTH ONE TIME DAILY    Obesity    Other orders  -     progesterone (PROMETRIUM) 100 MG capsule; Take 1 capsule (100 mg total) by mouth at bedtime.    Patient presents for annual wellness exam.  She has some concerns including increasing weight gain that feels uncontrolled.  She admits that she could be exercising better her biggest fear is that she is developing diabetes.  We are checking an A1c.  We reviewed her eating habits and try to make some recommendations.  She has had some irregularities in her menstrual cycle with further spacing and may be an early menopause.  She is also experiencing other symptoms of menopause including increased irritability, difficulty with sleep, occasional hot flashes.  Still has her uterus.  No abnormal bleeding cycles.  We discussed considering adding progesterone 100 mg once daily which may be helpful to although symptoms above and we can follow-up with her within 1 month and decide if we need to increase the dose before I see her back in 2 months.  She is going to continue citalopram 40 mg once daily for now and I recommended considering weight watchers for her weight gain.  We discussed the numbness in her hand and that it likely is not related to diabetes but rather carpal tunnel and she has a brace at home and educated her to use at bedtime for sleep and to be more mindful of how she is sleeping and not to sleep with her wrists flexed.  She has been diagnosed with obstructive sleep  apnea and is having a hard time with wearing the CPAP machine.  She will trial it again gradually and if she is still having a hard time wearing it we may consider giving her something for the feeling of claustrophobia  -declined gu exam because sees gynecologist. Recommended annual mammogram . Getting records for pap      Patient Instructions   I'll send you your lab results on [employer]    Send me a [employer] message in 1 month    Follow up in 2 months    Let me know about any abnormal bleeding. We can up the dose if your dosage is not working.    Use a hand brace at bed time for 3-6 weeks     Try Weight Watchers       Orders Placed This Encounter   Procedures     Lipid [facility] FASTING     Order Specific Question:   Fasting is required?     Answer:   Yes     Comprehensive Metabolic Panel     Glycosylated Hemoglobin A1c     HM2(CBC w/o Differential)     Thyroid [facility]     Medications Discontinued During This Encounter   Medication Reason     citalopram (CELEXA) 40 MG tablet Reorder       No Follow-up on file.     CHIEF COMPLAINT:  Chief Complaint   Patient presents with     Annual Exam     fasting labs, No pap today     HISTORY OF PRESENT ILLNESS:  Natalie is a 47 y.o. female presenting to the clinic today for an annual wellness visit.    Weight Gain: Natalie notes that her weight has been increasing although there has been no change in her diet or activity. She usually has 2-3 eggs with 1 piece of toast for breakfast. She usually has a sandwich with deli meat, cheese, and avocado and 3 cheese sticks for lunch. For dinner, she usually has tacos, fajitas, or spaghetti. When she has coffee, she adds creamer. She does not drink soda. There is a family history of diabetes so she is concerned about developing it herself. She also reports pain on the soles of her feet when she walks after waking up in the morning. The pain does not wake her up at night. She denies headaches or vision changes.     Sleep Apnea: She was  diagnosed with sleep apnea after a sleep test performed on 17. She was prescribed a CPAP machine. She usually forgets to wear it when she first goes to bed. Sometimes, she will wake up with a racing heart and put her CPAP on. She notes that her sleep quality has not improved and she frequently wakes up throughout the night and drifts off back to sleep since starting the CPAP machine. She sleeps for about 4-5 hours a night. Her sleep apnea episodes have reduced to 1-2x a night from 35x a night. She also reports that the heart palpitations have improved. She continues to experience day time fatigue, which makes it difficult for her to exercise.    Anxiety: She has a history of anxiety. She takes 40 mg of Citalopram. Her mental health during the day is fine although she acknowledges she worries a lot. When she has stopped taking Citalopram in the past, she felt worse and went back on it.     REVIEW OF SYSTEMS:   Her LMP was about 4 months ago after not having it for 1 year. She denies abdominal cramping.    Right Hand Numbness: She notes that her whole hand gets numb when she sleeps on it. She sustained a ligament tear on the right thumb on 10/26/15. She was prescribed a brace.    Plantar Fasciitis: She was fitted for orthodics for plantar fasciitis on her right foot on 16.    All other systems are negative.     PFSH:  No family history of uterine or breast cancers.  History   Smoking Status     Never Smoker   Smokeless Tobacco     Never Used     Family History   Problem Relation Age of Onset     Hypertension       mother     Stroke       father-  at 60     Diabetes       mother     Brain cancer       dx at 32 father      Asthma       son     Depression       father      Social History     Social History     Marital status:      Spouse name: N/A     Number of children: N/A     Years of education: N/A     Occupational History     Not on file.     Social History Main Topics     Smoking status: Never  "Smoker     Smokeless tobacco: Never Used     Alcohol use 2.5 oz/week     5 Standard drinks or equivalent per week      Comment: wine     Drug use: No     Sexual activity: Yes     Partners: Male      Comment:  vasectomy     Other Topics Concern     Not on file     Social History Narrative     Past Surgical History:   Procedure Laterality Date     NO PAST SURGERIES       Allergies   Allergen Reactions     Amoxicillin      Active Ambulatory Problems     Diagnosis Date Noted     History of MRSA infection 10/26/2015     Ligament tear- right thumb 10/26/2015     Obesity 10/26/2015     Panic attack 10/26/2015     ALESHA (generalized anxiety disorder) 10/26/2015     Plantar fasciitis of right foot 10/26/2015     history of Post partum depression 10/26/2015     WALDEMAR on CPAP 04/10/2018     Sleep difficulties 04/10/2018     Weight gain 04/10/2018     Menopausal symptoms 04/10/2018     Resolved Ambulatory Problems     Diagnosis Date Noted     Shortness of breath 10/26/2015     Insomnia 10/26/2015     Past Medical History:   Diagnosis Date     Post partum depression         VITALS:  Vitals:    04/10/18 0952   BP: 102/72   Patient Site: Right Arm   Patient Position: Sitting   Cuff Size: Adult Large   Pulse: 80   Resp: 16   Temp: 97.8  F (36.6  C)   TempSrc: Oral   Weight: 221 lb 6.4 oz (100.4 kg)   Height: 5' 7\" (1.702 m)     Wt Readings from Last 3 Encounters:   04/10/18 221 lb 6.4 oz (100.4 kg)   03/14/17 203 lb 11.2 oz (92.4 kg)   11/20/15 189 lb (85.7 kg)     Body mass index is 34.68 kg/(m^2).  No LMP recorded. Patient is not currently having periods (Reason: Premenopausal).      PHYSICAL EXAM:  GENERAL:  Reveals an alert 47 y.o. female in NAD.  Vitals:  Per nursing notes.  NECK:  Supple, thyroid not palpable.  EYES: PERRLA. Extraocular movements intact. Normal conjunctiva and lids.   ENT:  Hearing grossly normal.  Normal appearance to ears and nose. Bilateral TM s, external canals, oropharynx normal. Normal lips, gums and " teeth. Normal nasal mucosa, septum and turbinates.   CARDIAC:  Regular rate and rhythm without murmurs, rubs, or gallops. Legs without edema. Carotids without bruits.   LUNGS: Clear.  Respiratory effort normal.  SKIN:   Without rash, bruise, or palpable lesions.  RHEUM: Normal joints and nails of the hands.  NEURO:  Cranial nerves II-XII intact.     PSYCH:  Mood appropriate, memory intact.    Breast: normal breast exam  Musc: neg tinel's. Neg phalen , no defect   : deferred     QUALITY MEASURES:  The following high BMI interventions were performed this visit: encouragement to exercise and special diet education     DATA REVIEWED:  ADDITIONAL HISTORY SUMMARIZED (2): None.   DECISION TO OBTAIN EXTRA INFORMATION (1): getting records for pap   RADIOLOGY TESTS (1): None.  LABS (1): Ordered labs.  MEDICINE TESTS (1): None.  INDEPENDENT REVIEW (2 each): None.      The visit lasted a total of 43 minutes face to face with the patient. Over 50% of the time was spent counseling and educating the patient about exercise and nutrition.     I, Reva Busby, am scribing for and in the presence of Dr. Busby.  I, Dr. Reva Busby DO , personally performed the services described in this documentation, as scribed by Reva Busby in my presence, and it is both accurate and complete.     MEDICATIONS:  Current Outpatient Prescriptions   Medication Sig Dispense Refill     ALPRAZolam (XANAX) 1 MG tablet Take 1/2 tablet by mouth as needed for panic attack 10 tablet 0     citalopram (CELEXA) 40 MG tablet TAKE ONE TABLET BY MOUTH ONE TIME DAILY 90 tablet 3     minocycline (MINOCIN,DYNACIN) 100 MG capsule TAKE ONE CAPSULE BY MOUTH TWICE A DAY  3     progesterone (PROMETRIUM) 100 MG capsule Take 1 capsule (100 mg total) by mouth at bedtime. 30 capsule 1     No current facility-administered medications for this visit.         Total data points: 2

## 2021-06-17 NOTE — TELEPHONE ENCOUNTER
Please fax referral for CT scan to [facility] imaging in [facility]- Phone: [phone]  Fax: [phone]

## 2021-06-17 NOTE — TELEPHONE ENCOUNTER
Ct abdomen was performed Friday. Please [facility] and put scanned copy on my desk Monday pls

## 2021-06-19 NOTE — LETTER
Letter by Reva Busby DO at      Author: Reva Busby DO Service: -- Author Type: --    Filed:  Encounter Date: 7/30/2019 Status: (Other)         Natalie Kitchen  [address] [place] [address]             July 30, 2019         Dear Ms. Kitchen,    Below are the results from your recent visit:    Resulted Orders   Chlamydia trachomatis & Neisseria gonorrhoeae, Amplified Detection   Result Value Ref Range    Chlamydia trachomatis, Amplified Detection Negative Negative    Neisseria gonorrhoeae, Amplified Detection Negative Negative       Negative screening     Please call with questions or contact us using [email or web address].    Sincerely,        Electronically signed by Reva Busby DO

## 2021-06-19 NOTE — LETTER
Letter by Reva Busby DO at      Author: Reva Busby DO Service: -- Author Type: --    Filed:  Encounter Date: 7/30/2019 Status: (Other)         Natalie Kitchen  [address] [place] [address]             July 31, 2019         Dear Ms. Kitchen,    Below are the results from your recent visit:    Resulted Orders   Gynecologic Cytology (PAP Smear)   Result Value Ref Range    Case Report       Gynecologic Cytology Report                       Case: [id number]                                   Authorizing Provider:  Reva Busby DO    Collected:           07/26/2019 1208              Ordering Location:     [facility]     Received:            07/26/2019 1313                                     Medicine/OB                                                                  First Screen:          Candido Littlejohn, CT                                                                           (ASCP)                                                                       Specimen:    SUREPATH PAP, SCREENING, Endocervical/cervical                                             Interpretation  Negative for squamous intraepithelial lesion or malignancy.      Negative for squamous intraepithelial lesion or malignancy    Result Flag Normal Normal    Specimen Adequacy       Satisfactory for evaluation, endocervical/transformation zone component present    HPV Reflex? Yes regardless of result     HIGH RISK Yes     LMP/Menopause Date About a year ago     Abnormal Bleeding No     Pt Status NA     Birth Control/Hormones None     Previous Normal/Date About 2 yrs ago     Prev Abn Date/Dx College     Cervical Appearance Normal    Chlamydia trachomatis & Neisseria gonorrhoeae, Amplified Detection   Result Value Ref Range    Chlamydia trachomatis, Amplified Detection Negative Negative    Neisseria gonorrhoeae, Amplified Detection Negative Negative   HPV High Risk DNA Cervical   Result Value Ref  Range    HPV Source SurePath     HPV16 DNA Negative NEG    HPV18 DNA Negative NEG    Other HR HPV Negative NEG    Final Diagnosis SEE NOTES       Comment:      This patient's sample is negative for HPV DNA.  This test was developed and its performance characteristics determined by the  [facility], [facility]. It  has not been cleared or approved by the FDA. The laboratory is regulated under  CLIA as qualified to perform high-complexity testing. This test is used for  clinical purposes. It should not be regarded as investigational or for  research.  (Note)  METHODOLOGY:  The Roche fransisco 4800 system uses automated extraction,  simultaneous amplification of HPV (L1 region) and beta-globin,  followed by  real time detection of fluorescent labeled HPV and beta  globin using specific oligonucleotide probes . The test specifically  identifies types HPV 16 DNA and HPV 18 DNA while concurrently  detecting the rest of the high risk types (31, 33, 35, 39, 45, 51,  52, 56, 58, 59, 66 or 68).    COMMENTS:  This test is not intended for use as a screening device  for women under age 30 with normal cervical   cytology.  Results should  be correlated with cytologic and histologic findings. Close clinical  followup is recommended.        Specimen Description Cervical Cells       Comment:      Performed and/or entered by:  [address] [address]        your cervical cancer screening was negative.  You do not need to repeat this test for 5 years    Please call with questions or contact us using [employer].    Sincerely,        Electronically signed by Reva Busby DO

## 2021-06-25 NOTE — TELEPHONE ENCOUNTER
Fax US order to [facility] imaging.   Fax referral- Phone: [phone]   Fax: [phone]     Results return to Dr. Busby [phone]    Send [employer] message to pt to confirm faxed so she can schedule

## 2021-07-04 NOTE — ADDENDUM NOTE
Addendum Note by Lyubov Concepcion DO at 5/26/2021 10:21 AM     Author: Lyubov Concepcion DO Service: -- Author Type: Physician    Filed: 5/26/2021 10:21 AM Encounter Date: 5/16/2021 Status: Signed    : Lyubov Concepcion DO (Physician)    Addended by: LYUBOV CONCEPCION on: 5/26/2021 10:21 AM        Modules accepted: Orders

## 2021-11-19 ENCOUNTER — TRANSFERRED RECORDS (OUTPATIENT)
Dept: HEALTH INFORMATION MANAGEMENT | Facility: CLINIC | Age: 51
End: 2021-11-19

## 2022-01-19 DIAGNOSIS — F41.9 ANXIETY: ICD-10-CM

## 2022-01-21 RX ORDER — CITALOPRAM HYDROBROMIDE 40 MG/1
TABLET ORAL
Qty: 90 TABLET | Refills: 1 | Status: SHIPPED | OUTPATIENT
Start: 2022-01-21 | End: 2022-08-07

## 2022-01-21 NOTE — TELEPHONE ENCOUNTER
"Last Written Prescription Date:  01/04/2021  Last Fill Quantity: 90,  # refills: 3   Last office visit provider:   05/07/2021 with Dr Masterson.    Requested Prescriptions   Pending Prescriptions Disp Refills     citalopram (CELEXA) 40 MG tablet [Pharmacy Med Name: CITALOPRAM HBR 40 MG TABLET] 90 tablet 3     Sig: TAKE 1 TABLET BY MOUTH EVERY DAY       SSRIs Protocol Passed - 1/19/2022 12:26 AM        Passed - Recent (12 mo) or future (30 days) visit within the authorizing provider's specialty     Patient has had an office visit with the authorizing provider or a provider within the authorizing providers department within the previous 12 mos or has a future within next 30 days. See \"Patient Info\" tab in inbasket, or \"Choose Columns\" in Meds & Orders section of the refill encounter.              Passed - Medication is active on med list        Passed - Patient is age 18 or older        Passed - No active pregnancy on record        Passed - No positive pregnancy test in last 12 months             Sudha White 01/21/22 8:15 AM  "

## 2022-03-04 ENCOUNTER — TRANSFERRED RECORDS (OUTPATIENT)
Dept: HEALTH INFORMATION MANAGEMENT | Facility: CLINIC | Age: 52
End: 2022-03-04

## 2022-04-04 ENCOUNTER — TRANSFERRED RECORDS (OUTPATIENT)
Dept: HEALTH INFORMATION MANAGEMENT | Facility: CLINIC | Age: 52
End: 2022-04-04

## 2022-07-12 ENCOUNTER — TRANSFERRED RECORDS (OUTPATIENT)
Dept: HEALTH INFORMATION MANAGEMENT | Facility: CLINIC | Age: 52
End: 2022-07-12

## 2022-08-07 DIAGNOSIS — F41.9 ANXIETY: ICD-10-CM

## 2022-08-07 RX ORDER — CITALOPRAM HYDROBROMIDE 40 MG/1
TABLET ORAL
Qty: 90 TABLET | Refills: 1 | Status: SHIPPED | OUTPATIENT
Start: 2022-08-07 | End: 2023-02-17

## 2022-08-07 NOTE — TELEPHONE ENCOUNTER
"Routing refill request to provider for review/approval because:  Patient needs to be seen because it has been more than 1 year since last office visit.    Last Written Prescription Date:  1/21/22  Last Fill Quantity: 90,  # refills: 1   Last office visit provider:  5/7/21     Requested Prescriptions   Pending Prescriptions Disp Refills     citalopram (CELEXA) 40 MG tablet [Pharmacy Med Name: CITALOPRAM HBR 40 MG TABLET] 90 tablet 1     Sig: TAKE 1 TABLET BY MOUTH EVERY DAY       SSRIs Protocol Failed - 8/7/2022  7:49 AM        Failed - Recent (12 mo) or future (30 days) visit within the authorizing provider's specialty     Patient has had an office visit with the authorizing provider or a provider within the authorizing providers department within the previous 12 mos or has a future within next 30 days. See \"Patient Info\" tab in inbasket, or \"Choose Columns\" in Meds & Orders section of the refill encounter.              Passed - Medication is active on med list        Passed - Patient is age 18 or older        Passed - No active pregnancy on record        Passed - No positive pregnancy test in last 12 months             Day Tse, RN 08/07/22 11:22 AM  "

## 2022-09-16 NOTE — TELEPHONE ENCOUNTER
DIAGNOSIS: 2nd opinion left knee/   APPOINTMENT DATE: 10/13/2022   NOTES STATUS DETAILS   OFFICE NOTE from referring provider N/A    OFFICE NOTE from other specialist N/A    DISCHARGE SUMMARY from hospital N/A    DISCHARGE REPORT from the ER N/A    OPERATIVE REPORT Received 04/04/2022 02/17/2020   MEDICATION LIST N/A    EMG (for Spine) N/A    IMPLANT RECORD/STICKER N/A    LABS     CBC/DIFF N/A    CULTURES N/A    INJECTIONS DONE IN RADIOLOGY N/A    MRI Received 03/04/2022 LFT knee  02/08/2020 LFT knee  09/02/2016 femur   CT SCAN N/A    XRAYS (IMAGES & REPORTS) Received 02/28/2022 bilateral knees   TUMOR     PATHOLOGY  Slides & report N/A      OP note in provider RightFax folder.

## 2022-10-13 ENCOUNTER — TRANSFERRED RECORDS (OUTPATIENT)
Dept: HEALTH INFORMATION MANAGEMENT | Facility: CLINIC | Age: 52
End: 2022-10-13

## 2022-10-13 ENCOUNTER — PRE VISIT (OUTPATIENT)
Dept: ORTHOPEDICS | Facility: CLINIC | Age: 52
End: 2022-10-13

## 2022-10-13 ENCOUNTER — OFFICE VISIT (OUTPATIENT)
Dept: ORTHOPEDICS | Facility: CLINIC | Age: 52
End: 2022-10-13
Payer: COMMERCIAL

## 2022-10-13 DIAGNOSIS — M25.562 LEFT KNEE PAIN, UNSPECIFIED CHRONICITY: ICD-10-CM

## 2022-10-13 DIAGNOSIS — M25.562 BILATERAL CHRONIC KNEE PAIN: Primary | ICD-10-CM

## 2022-10-13 DIAGNOSIS — G89.29 BILATERAL CHRONIC KNEE PAIN: Primary | ICD-10-CM

## 2022-10-13 DIAGNOSIS — M25.561 BILATERAL CHRONIC KNEE PAIN: Primary | ICD-10-CM

## 2022-10-13 DIAGNOSIS — M25.561 RIGHT KNEE PAIN, UNSPECIFIED CHRONICITY: Primary | ICD-10-CM

## 2022-10-13 PROCEDURE — 99204 OFFICE O/P NEW MOD 45 MIN: CPT | Performed by: ORTHOPAEDIC SURGERY

## 2022-10-13 NOTE — PROGRESS NOTES
CHIEF CONCERN: Left knee pain    HISTORY:   Natalie Kitchen is a 52 y/o female who presents to our clinic for evaluation of L knee pain/stiffness/swelling. In March of this year she noted significant pain with weight bearing and walking in the L knee. MRI was ordered which revealed a L medial meniscal root injury. In April of this year she underwent L medial meniscal root repair at [facility], however, unfortunately her R knee was operated upon.  At that time they did perform what sounds to be a partial medial meniscectomy R knee.  From review of the records and from discussing with the patient upon taking down the drapes the identified that they have done the incorrect side and then proceeded to do the correct left knee surgery.  At that time they identified a medial meniscus root tear consistent with the preoperative imaging and a root repair was performed.    Understandably the patient has experienced significant emotional distress following this event. She reported that recovery from this surgery was particularly challenging as she was forced to use her R knee for mobilization. This caused significant pain and swelling in the R knee which has subsequently improved. The L knee continues to give her issues following the medial meniscal root repair. She endorses difficulty using stairs, walking, or terminally flexing her L knee without pain. She continues to endorse clicking, locking, and swelling of the L knee after activity.     Patient is concerned that she may still have pathology in her L knee and would also like her R knee evaluated as it was without issue prior to surgery.     PAST MEDICAL HISTORY: (Reviewed with the patient and in the [email or web address] medical record)  1. ALESHA    PAST SURGICAL HISTORY: (Reviewed with the patient and in the [facility] medical record)  1. R knee medial meniscectomy?  2. L knee Medial meniscal root repair     MEDICATIONS: (Reviewed with the patient and in the [facility] medical record)    Notable  medications include: Citalopram    ALLERGIES: (Reviewed with the patient and in the EPIC medical record)  Amoxacillin    SOCIAL HISTORY: (Reviewed with the patient and in the medical record)  --Tobacco: None  --Occupation: Human Resources  --Avocation/Sport: Dogwalking    FAMILY HISTORY: (Reviewed with the patient and in the medical record)  -- No family history of bleeding, clotting, or difficulty with anesthesia    REVIEW OF SYSTEMS: (Reviewed with the patient and on the health intake form)  -- A comprehensive 10 point review of systems was conducted and is negative except as noted in the HPI    EXAM:     General: Awake, Alert and Oriented, No acute Distress. Articulate and Interactive    There is no height or weight on file to calculate BMI.    Bilateral Lower extremities :    Skin is Warm and Well perfused, no suggestion of infection    No noted effusion on R knee, mild effusion noted on the L knee. No erythema or ecchymosis bilaterally    Well-healed surgical incisions    Full active and passive ROM bilaterally    Tenderness to palpation noted over the L medial tibial joint line. No tenderness noted in the R knee.    No ligamentous laxity noted in bilateral knees. Stable to varus/valgus stress testing. Lachman 0 bilaterally. Negative posterior drawer bilaterally.     EHL/FHL/TA/GS 5/5    Sensation intact L3-S1    2+ Dorsalis Pedis Pulse    IMAGING:  Plain radiographs obtainedIn February 2022 and reviewed by myself show joint space narrowing on the right greater than the left knee.  Lateral compartments are well-preserved.  Patellofemoral chondrosis is present.      MRI of the left knee from March 2022 does demonstrate what appears to be a medial meniscus root tear.  Articular cartilage appears otherwise intact.  Patellofemoral compartment is largely preserved.  Positive effusion.    ASSESSMENT:  1. 6 months status post right knee medial meniscectomy and left knee medial meniscus root repair    PLAN:  A long  discussion today with the patient.  She clearly still is suffering emotionally from the events of her surgical procedure earlier this year.  I do think it is reasonable to further work this up.    I would like to get new radiographs of the patient knee today including PA flexion weightbearing, lateral and patellofemoral views of both knees.  I will also order an MRI of her right knee, MRI of her left knee.  She will come back to my clinic to discuss the results once that it is available

## 2022-10-13 NOTE — LETTER
10/13/2022         RE: Natalie Kitchen  [address] [facility] [id number]        Dear Colleague,    Thank you for referring your patient, Natalie Kitchen, to the [facility]. Please see a copy of my visit note below.    CHIEF CONCERN: Left knee pain    HISTORY:   Natalie Kitchen is a 52 y/o female who presents to our clinic for evaluation of L knee pain/stiffness/swelling. In March of this year she noted significant pain with weight bearing and walking in the L knee. MRI was ordered which revealed a L medial meniscal root injury. In April of this year she underwent L medial meniscal root repair at [facility], however, unfortunately her R knee was operated upon.  At that time they did perform what sounds to be a partial medial meniscectomy R knee.  From review of the records and from discussing with the patient upon taking down the drapes the identified that they have done the incorrect side and then proceeded to do the correct left knee surgery.  At that time they identified a medial meniscus root tear consistent with the preoperative imaging and a root repair was performed.    Understandably the patient has experienced significant emotional distress following this event. She reported that recovery from this surgery was particularly challenging as she was forced to use her R knee for mobilization. This caused significant pain and swelling in the R knee which has subsequently improved. The L knee continues to give her issues following the medial meniscal root repair. She endorses difficulty using stairs, walking, or terminally flexing her L knee without pain. She continues to endorse clicking, locking, and swelling of the L knee after activity.     Patient is concerned that she may still have pathology in her L knee and would also like her R knee evaluated as it was without issue prior to surgery.     PAST MEDICAL HISTORY: (Reviewed with the patient and in the [facility] [employer]  record)  1. ALESHA    PAST SURGICAL HISTORY: (Reviewed with the patient and in the [facility] medical record)  1. R knee medial meniscectomy?  2. L knee Medial meniscal root repair     MEDICATIONS: (Reviewed with the patient and in the [facility] medical record)    Notable medications include: Citalopram    ALLERGIES: (Reviewed with the patient and in the EPIC medical record)  Amoxacillin    SOCIAL HISTORY: (Reviewed with the patient and in the medical record)  --Tobacco: None  --Occupation: Human Resources  --Avocation/Sport: Dogwalking    FAMILY HISTORY: (Reviewed with the patient and in the medical record)  -- No family history of bleeding, clotting, or difficulty with anesthesia    REVIEW OF SYSTEMS: (Reviewed with the patient and on the health intake form)  -- A comprehensive 10 point review of systems was conducted and is negative except as noted in the HPI    EXAM:     General: Awake, Alert and Oriented, No acute Distress. Articulate and Interactive    There is no height or weight on file to calculate BMI.    Bilateral Lower extremities :    Skin is Warm and Well perfused, no suggestion of infection    No noted effusion on R knee, mild effusion noted on the L knee. No erythema or ecchymosis bilaterally    Well-healed surgical incisions    Full active and passive ROM bilaterally    Tenderness to palpation noted over the L medial tibial joint line. No tenderness noted in the R knee.    No ligamentous laxity noted in bilateral knees. Stable to varus/valgus stress testing. Lachman 0 bilaterally. Negative posterior drawer bilaterally.     EHL/FHL/TA/GS 5/5    Sensation intact L3-S1    2+ Dorsalis Pedis Pulse    IMAGING:  Plain radiographs obtainedIn February 2022 and reviewed by myself show joint space narrowing on the right greater than the left knee.  Lateral compartments are well-preserved.  Patellofemoral chondrosis is present.      MRI of the left knee from March 2022 does demonstrate what appears to be a medial meniscus  root tear.  Articular cartilage appears otherwise intact.  Patellofemoral compartment is largely preserved.  Positive effusion.    ASSESSMENT:  1. 6 months status post right knee medial meniscectomy and left knee medial meniscus root repair    PLAN:  A long discussion today with the patient.  She clearly still is suffering emotionally from the events of her surgical procedure earlier this year.  I do think it is reasonable to further work this up.    I would like to get new radiographs of the patient knee today including PA flexion weightbearing, lateral and patellofemoral views of both knees.  I will also order an MRI of her right knee, MRI of her left knee.  She will come back to my clinic to discuss the results once that it is available        Again, thank you for allowing me to participate in the care of your patient.        Sincerely,        Oscar Shrestha MD

## 2022-10-13 NOTE — NURSING NOTE
Reason For Visit:   Chief Complaint   Patient presents with     Consult     Bilateral knee pain - images in pacs of left knee       ?  No  Occupation .  Currently working? Yes.  Work status?  Full time.  Date of injury: no date surgery 4/22 3/22  Type of injury: none.  Date of surgery: 4/22  Type of surgery: Medial meniscal root tear. Right knee accident  Smoker: No  Request smoking cessation information: No    Sane Score  Bilateral knee - Affected  Left Knee-  75  Right Knee-  90      Pili Palm, EMT

## 2022-10-17 ENCOUNTER — TELEPHONE (OUTPATIENT)
Dept: ORTHOPEDICS | Facility: CLINIC | Age: 52
End: 2022-10-17

## 2022-10-17 NOTE — TELEPHONE ENCOUNTER
Health Call Center    Phone Message    May a detailed message be left on voicemail: yes     Reason for Call: Other: Pt currently scheduled for MRI results with Dr. Shrestha on 11/14.  Pt asking if proviider would consider doing phone or video visit instead of face to face ?? Pt would be coming from [place].  First available for video/phone is not until December 1st.  Pt declined that day.  Pt would like call back at [phone]     No openings in [facility] clinic until Dec 1st.       Action Taken: Other: Dr. Oscar Shrestha    Travel Screening: Not Applicable

## 2022-10-17 NOTE — TELEPHONE ENCOUNTER
Returned call to patient, advising that we could keep her same appointment date and time, but switch to a virtual appointment. She opted for a video visit.  Appointment type switched. She was also sent an email to sign up for [email or web address]. She will let us know if she has any issues signing up. She was grateful for the call.

## 2022-10-23 ENCOUNTER — HEALTH MAINTENANCE LETTER (OUTPATIENT)
Age: 52
End: 2022-10-23

## 2022-11-09 ENCOUNTER — ANCILLARY PROCEDURE (OUTPATIENT)
Dept: GENERAL RADIOLOGY | Facility: CLINIC | Age: 52
End: 2022-11-09
Attending: ORTHOPAEDIC SURGERY
Payer: COMMERCIAL

## 2022-11-09 ENCOUNTER — ANCILLARY PROCEDURE (OUTPATIENT)
Dept: MRI IMAGING | Facility: CLINIC | Age: 52
End: 2022-11-09
Attending: ORTHOPAEDIC SURGERY
Payer: COMMERCIAL

## 2022-11-09 DIAGNOSIS — M25.562 BILATERAL CHRONIC KNEE PAIN: ICD-10-CM

## 2022-11-09 DIAGNOSIS — G89.29 BILATERAL CHRONIC KNEE PAIN: ICD-10-CM

## 2022-11-09 DIAGNOSIS — M25.562 LEFT KNEE PAIN, UNSPECIFIED CHRONICITY: ICD-10-CM

## 2022-11-09 DIAGNOSIS — M25.561 RIGHT KNEE PAIN, UNSPECIFIED CHRONICITY: ICD-10-CM

## 2022-11-09 DIAGNOSIS — M25.561 BILATERAL CHRONIC KNEE PAIN: ICD-10-CM

## 2022-11-09 PROCEDURE — 73721 MRI JNT OF LWR EXTRE W/O DYE: CPT | Mod: RT | Performed by: RADIOLOGY

## 2022-11-09 PROCEDURE — 73721 MRI JNT OF LWR EXTRE W/O DYE: CPT | Mod: LT | Performed by: RADIOLOGY

## 2022-11-09 PROCEDURE — 73564 X-RAY EXAM KNEE 4 OR MORE: CPT | Mod: RT | Performed by: RADIOLOGY

## 2022-11-14 ENCOUNTER — VIRTUAL VISIT (OUTPATIENT)
Dept: ORTHOPEDICS | Facility: CLINIC | Age: 52
End: 2022-11-14
Payer: COMMERCIAL

## 2022-11-14 DIAGNOSIS — G89.29 CHRONIC PAIN OF LEFT KNEE: Primary | ICD-10-CM

## 2022-11-14 DIAGNOSIS — G89.29 CHRONIC PAIN OF RIGHT KNEE: ICD-10-CM

## 2022-11-14 DIAGNOSIS — M25.562 CHRONIC PAIN OF LEFT KNEE: Primary | ICD-10-CM

## 2022-11-14 DIAGNOSIS — M25.561 CHRONIC PAIN OF RIGHT KNEE: ICD-10-CM

## 2022-11-14 PROCEDURE — 99214 OFFICE O/P EST MOD 30 MIN: CPT | Mod: TEL | Performed by: ORTHOPAEDIC SURGERY

## 2022-11-14 NOTE — LETTER
11/14/2022         RE: Nataile Kitchen  [address] [facility] [id number]        Dear Colleague,    Thank you for referring your patient, Natalie Kitchen, to the [facility] [place]. Please see a copy of my visit note below.          Natalie is a 52 year old who is being evaluated via a billable telephone visit.      I the opportunity to do a telephone visit today with Natalie regarding her right and her left knee.  She is a very complex history regarding both sides after undergoing surgery to both knees earlier this year.  When she saw me in clinic in the last couple weeks.  We elected to get new imaging just as a baseline and she returns to clinic for a virtual visit to discuss the results today.    No examination was completed today as this was a virtual visit.  It sounds that overall she was largely feeling well.    Plain radiographs obtained today show overall well-preserved right and left tibiofemoral compartments.  There is just a trace amount of joint space narrowing as seen in the left medial compartment.  This is the area where we also see the tunnel for her meniscus root repair.  No full-thickness cartilage loss.    MRI of both knees were reviewed today as well.  The left knee shows what appears to me to be an intact medial meniscus root tear.  Lateral compartment cartilage appears intact.  No lateral meniscus tears.  Low-grade chondrosis patellofemoral compartment and medial compartment.    MRI of the right knee today shows maybe is a little bit more chondrosis than on the left.  No definite meniscus tears or meniscus pathology some blunting of the medial compartment meniscus is noted consistent with prior meniscectomy    Clinical assessment:  Status post left knee medial meniscus root repair with intact meniscus root on follow-up imaging and early chondrosis of the medial compartment    Status post right knee partial medial meniscectomy with some  chondrosis in the medial compartment though no full-thickness cartilage loss    Plan: I told the patient this time that we now have been obtain baseline data we know exactly what the inside of her knee looks like.  I told her that the only things that she has good control over to improve the long-term health of her knee is to keep her muscle strong to keep her joints moving to keep her weight at a good level and to not smoke.    I discussed with her the pros cons risks and benefits we discussed the limitations of any potential future surgery.  At this time I think her goal should be to continue to maximize nonsurgical management.  Going forward she would be a candidate for oral anti-inflammatories or corticosteroid injections on an as-needed basis.  At this time she can follow-up with me on an as-needed basis and certainly she should let me know if she should have symptoms going forward.        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        Sincerely,        sOcar Shrestha MD

## 2022-11-14 NOTE — PROGRESS NOTES
Natalie is a 52 year old who is being evaluated via a billable telephone visit.      I the opportunity to do a telephone visit today with Natalie regarding her right and her left knee.  She is a very complex history regarding both sides after undergoing surgery to both knees earlier this year.  When she saw me in clinic in the last couple weeks.  We elected to get new imaging just as a baseline and she returns to clinic for a virtual visit to discuss the results today.    No examination was completed today as this was a virtual visit.  It sounds that overall she was largely feeling well.    Plain radiographs obtained today show overall well-preserved right and left tibiofemoral compartments.  There is just a trace amount of joint space narrowing as seen in the left medial compartment.  This is the area where we also see the tunnel for her meniscus root repair.  No full-thickness cartilage loss.    MRI of both knees were reviewed today as well.  The left knee shows what appears to me to be an intact medial meniscus root tear.  Lateral compartment cartilage appears intact.  No lateral meniscus tears.  Low-grade chondrosis patellofemoral compartment and medial compartment.    MRI of the right knee today shows maybe is a little bit more chondrosis than on the left.  No definite meniscus tears or meniscus pathology some blunting of the medial compartment meniscus is noted consistent with prior meniscectomy    Clinical assessment:  Status post left knee medial meniscus root repair with intact meniscus root on follow-up imaging and early chondrosis of the medial compartment    Status post right knee partial medial meniscectomy with some chondrosis in the medial compartment though no full-thickness cartilage loss    Plan: I told the patient this time that we now have been obtain baseline data we know exactly what the inside of her knee looks like.  I told her that the only things that she has good control over to  improve the long-term health of her knee is to keep her muscle strong to keep her joints moving to keep her weight at a good level and to not smoke.    I discussed with her the pros cons risks and benefits we discussed the limitations of any potential future surgery.  At this time I think her goal should be to continue to maximize nonsurgical management.  Going forward she would be a candidate for oral anti-inflammatories or corticosteroid injections on an as-needed basis.  At this time she can follow-up with me on an as-needed basis and certainly she should let me know if she should have symptoms going forward.      What phone number would you like to be contacted at? [phone]  How would you like to obtain your AVS? 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

## 2023-04-02 ENCOUNTER — HEALTH MAINTENANCE LETTER (OUTPATIENT)
Age: 53
End: 2023-04-02

## 2023-05-24 ENCOUNTER — TRANSFERRED RECORDS (OUTPATIENT)
Dept: HEALTH INFORMATION MANAGEMENT | Facility: CLINIC | Age: 53
End: 2023-05-24
Payer: COMMERCIAL

## 2024-10-20 ENCOUNTER — HEALTH MAINTENANCE LETTER (OUTPATIENT)
Age: 54
End: 2024-10-20

## 2025-07-27 ENCOUNTER — HEALTH MAINTENANCE LETTER (OUTPATIENT)
Age: 55
End: 2025-07-27